# Patient Record
Sex: MALE | Race: WHITE | Employment: OTHER | URBAN - METROPOLITAN AREA
[De-identification: names, ages, dates, MRNs, and addresses within clinical notes are randomized per-mention and may not be internally consistent; named-entity substitution may affect disease eponyms.]

---

## 2018-01-04 PROBLEM — H26.9 RIGHT CATARACT: Status: ACTIVE | Noted: 2018-01-04

## 2018-06-04 ENCOUNTER — ANESTHESIA EVENT (OUTPATIENT)
Dept: OPERATING ROOM | Age: 78
End: 2018-06-04
Payer: MEDICARE

## 2018-06-05 ENCOUNTER — ANESTHESIA (OUTPATIENT)
Dept: OPERATING ROOM | Age: 78
End: 2018-06-05
Payer: MEDICARE

## 2018-06-05 ENCOUNTER — HOSPITAL ENCOUNTER (OUTPATIENT)
Age: 78
Setting detail: OUTPATIENT SURGERY
Discharge: HOME | End: 2018-06-05
Attending: OPHTHALMOLOGY | Admitting: OPHTHALMOLOGY
Payer: MEDICARE

## 2018-06-05 VITALS
OXYGEN SATURATION: 99 % | DIASTOLIC BLOOD PRESSURE: 98 MMHG | RESPIRATION RATE: 15 BRPM | SYSTOLIC BLOOD PRESSURE: 170 MMHG

## 2018-06-05 VITALS
BODY MASS INDEX: 27.74 KG/M2 | SYSTOLIC BLOOD PRESSURE: 166 MMHG | HEIGHT: 68 IN | WEIGHT: 183 LBS | RESPIRATION RATE: 14 BRPM | OXYGEN SATURATION: 97 % | DIASTOLIC BLOOD PRESSURE: 104 MMHG | HEART RATE: 63 BPM

## 2018-06-05 PROBLEM — H57.9 LEFT EYE COMPLAINT: Status: ACTIVE | Noted: 2018-06-05

## 2018-06-05 LAB — GLUCOSE BLD-MCNC: 164 MG/DL

## 2018-06-05 PROCEDURE — 2580000003 HC RX 258: Performed by: ANESTHESIOLOGY

## 2018-06-05 PROCEDURE — 3700000000 HC ANESTHESIA ATTENDED CARE: Performed by: OPHTHALMOLOGY

## 2018-06-05 PROCEDURE — 3600000003 HC SURGERY LEVEL 3 BASE: Performed by: OPHTHALMOLOGY

## 2018-06-05 PROCEDURE — 7100000010 HC PHASE II RECOVERY - FIRST 15 MIN: Performed by: OPHTHALMOLOGY

## 2018-06-05 PROCEDURE — 6370000000 HC RX 637 (ALT 250 FOR IP): Performed by: OPHTHALMOLOGY

## 2018-06-05 PROCEDURE — 82962 GLUCOSE BLOOD TEST: CPT | Performed by: OPHTHALMOLOGY

## 2018-06-05 PROCEDURE — 6360000002 HC RX W HCPCS: Performed by: NURSE ANESTHETIST, CERTIFIED REGISTERED

## 2018-06-05 PROCEDURE — 2500000003 HC RX 250 WO HCPCS: Performed by: OPHTHALMOLOGY

## 2018-06-05 PROCEDURE — V2632 POST CHMBR INTRAOCULAR LENS: HCPCS | Performed by: OPHTHALMOLOGY

## 2018-06-05 PROCEDURE — 7100000011 HC PHASE II RECOVERY - ADDTL 15 MIN: Performed by: OPHTHALMOLOGY

## 2018-06-05 DEVICE — LENS INTOCU +17.0 DIOPT A CONSTANT 118.8 L13MM DIA6MM 0DEG: Type: IMPLANTABLE DEVICE | Site: EYE | Status: FUNCTIONAL

## 2018-06-05 RX ORDER — TETRACAINE HYDROCHLORIDE 5 MG/ML
1 SOLUTION OPHTHALMIC ONCE
Status: COMPLETED | OUTPATIENT
Start: 2018-06-05 | End: 2018-06-05

## 2018-06-05 RX ORDER — TETRACAINE HYDROCHLORIDE 5 MG/ML
SOLUTION OPHTHALMIC PRN
Status: DISCONTINUED | OUTPATIENT
Start: 2018-06-05 | End: 2018-06-05 | Stop reason: HOSPADM

## 2018-06-05 RX ORDER — BALANCED SALT SOLUTION 6.4; .75; .48; .3; 3.9; 1.7 MG/ML; MG/ML; MG/ML; MG/ML; MG/ML; MG/ML
SOLUTION OPHTHALMIC PRN
Status: DISCONTINUED | OUTPATIENT
Start: 2018-06-05 | End: 2018-06-05 | Stop reason: HOSPADM

## 2018-06-05 RX ORDER — PHENYLEPHRINE HYDROCHLORIDE 100 MG/ML
1 SOLUTION/ DROPS OPHTHALMIC PRN
Status: DISCONTINUED | OUTPATIENT
Start: 2018-06-05 | End: 2018-06-05 | Stop reason: HOSPADM

## 2018-06-05 RX ORDER — MIDAZOLAM HYDROCHLORIDE 1 MG/ML
INJECTION INTRAMUSCULAR; INTRAVENOUS PRN
Status: DISCONTINUED | OUTPATIENT
Start: 2018-06-05 | End: 2018-06-05 | Stop reason: SDUPTHER

## 2018-06-05 RX ORDER — PHENYLEPHRINE HCL 2.5 %
1 DROPS OPHTHALMIC (EYE)
Status: COMPLETED | OUTPATIENT
Start: 2018-06-05 | End: 2018-06-05

## 2018-06-05 RX ORDER — CYCLOPENTOLATE HYDROCHLORIDE 10 MG/ML
1 SOLUTION/ DROPS OPHTHALMIC
Status: COMPLETED | OUTPATIENT
Start: 2018-06-05 | End: 2018-06-05

## 2018-06-05 RX ORDER — FENTANYL CITRATE 50 UG/ML
INJECTION, SOLUTION INTRAMUSCULAR; INTRAVENOUS PRN
Status: DISCONTINUED | OUTPATIENT
Start: 2018-06-05 | End: 2018-06-05 | Stop reason: SDUPTHER

## 2018-06-05 RX ORDER — SODIUM CHLORIDE, SODIUM LACTATE, POTASSIUM CHLORIDE, CALCIUM CHLORIDE 600; 310; 30; 20 MG/100ML; MG/100ML; MG/100ML; MG/100ML
INJECTION, SOLUTION INTRAVENOUS CONTINUOUS
Status: DISCONTINUED | OUTPATIENT
Start: 2018-06-05 | End: 2018-06-05 | Stop reason: HOSPADM

## 2018-06-05 RX ORDER — FLURBIPROFEN SODIUM 0.3 MG/ML
1 SOLUTION/ DROPS OPHTHALMIC
Status: COMPLETED | OUTPATIENT
Start: 2018-06-05 | End: 2018-06-05

## 2018-06-05 RX ADMIN — TETRACAINE HYDROCHLORIDE 1 DROP: 5 SOLUTION OPHTHALMIC at 06:20

## 2018-06-05 RX ADMIN — PHENYLEPHRINE HYDROCHLORIDE 1 DROP: 25 SOLUTION OPHTHALMIC at 05:50

## 2018-06-05 RX ADMIN — CYCLOPENTOLATE HYDROCHLORIDE 1 DROP: 10 SOLUTION/ DROPS OPHTHALMIC at 05:45

## 2018-06-05 RX ADMIN — MIDAZOLAM HYDROCHLORIDE 1 MG: 1 INJECTION INTRAMUSCULAR; INTRAVENOUS at 06:34

## 2018-06-05 RX ADMIN — PHENYLEPHRINE HYDROCHLORIDE 1 DROP: 25 SOLUTION OPHTHALMIC at 05:55

## 2018-06-05 RX ADMIN — PHENYLEPHRINE HYDROCHLORIDE 1 DROP: 25 SOLUTION OPHTHALMIC at 05:45

## 2018-06-05 RX ADMIN — FLURBIPROFEN SODIUM 1 DROP: 0.3 SOLUTION/ DROPS OPHTHALMIC at 05:50

## 2018-06-05 RX ADMIN — CYCLOPENTOLATE HYDROCHLORIDE 1 DROP: 10 SOLUTION/ DROPS OPHTHALMIC at 05:50

## 2018-06-05 RX ADMIN — MIDAZOLAM HYDROCHLORIDE 0.5 MG: 1 INJECTION INTRAMUSCULAR; INTRAVENOUS at 06:35

## 2018-06-05 RX ADMIN — FENTANYL CITRATE 50 MCG: 50 INJECTION, SOLUTION INTRAMUSCULAR; INTRAVENOUS at 06:34

## 2018-06-05 RX ADMIN — CYCLOPENTOLATE HYDROCHLORIDE 1 DROP: 10 SOLUTION/ DROPS OPHTHALMIC at 05:55

## 2018-06-05 RX ADMIN — FLURBIPROFEN SODIUM 1 DROP: 0.3 SOLUTION/ DROPS OPHTHALMIC at 05:45

## 2018-06-05 RX ADMIN — MIDAZOLAM HYDROCHLORIDE 0.5 MG: 1 INJECTION INTRAMUSCULAR; INTRAVENOUS at 06:38

## 2018-06-05 RX ADMIN — FENTANYL CITRATE 50 MCG: 50 INJECTION, SOLUTION INTRAMUSCULAR; INTRAVENOUS at 06:36

## 2018-06-05 RX ADMIN — FLURBIPROFEN SODIUM 1 DROP: 0.3 SOLUTION/ DROPS OPHTHALMIC at 05:55

## 2018-06-05 RX ADMIN — SODIUM CHLORIDE, POTASSIUM CHLORIDE, SODIUM LACTATE AND CALCIUM CHLORIDE: 600; 310; 30; 20 INJECTION, SOLUTION INTRAVENOUS at 05:48

## 2018-06-05 ASSESSMENT — PAIN SCALES - GENERAL
PAINLEVEL_OUTOF10: 0

## 2018-06-05 ASSESSMENT — PULMONARY FUNCTION TESTS
PIF_VALUE: 0

## 2018-06-05 ASSESSMENT — PAIN - FUNCTIONAL ASSESSMENT: PAIN_FUNCTIONAL_ASSESSMENT: 0-10

## 2019-06-24 ENCOUNTER — HOSPITAL ENCOUNTER (OUTPATIENT)
Age: 79
Discharge: HOME | End: 2019-06-26

## 2019-06-24 PROCEDURE — 88305 TISSUE EXAM BY PATHOLOGIST: CPT

## 2019-06-24 PROCEDURE — 88342 IMHCHEM/IMCYTCHM 1ST ANTB: CPT

## 2020-08-27 ENCOUNTER — FOLLOWUP TELEPHONE ENCOUNTER (OUTPATIENT)
Dept: DIABETES SERVICES | Age: 80
End: 2020-08-27

## 2020-10-26 ENCOUNTER — HOSPITAL ENCOUNTER (OUTPATIENT)
Age: 80
Discharge: HOME | End: 2020-10-28

## 2020-10-26 PROCEDURE — 88305 TISSUE EXAM BY PATHOLOGIST: CPT

## 2020-10-27 ENCOUNTER — HOSPITAL ENCOUNTER (OUTPATIENT)
Age: 80
Discharge: HOME | End: 2020-10-29

## 2020-10-27 LAB
ANION GAP SERPL CALCULATED.3IONS-SCNC: 9 MMOL/L (ref 7–16)
BUN BLDV-MCNC: 14 MG/DL (ref 8–23)
CALCIUM SERPL-MCNC: 7.7 MG/DL (ref 8.6–10.2)
CHLORIDE BLD-SCNC: 107 MMOL/L (ref 98–107)
CO2: 21 MMOL/L (ref 22–29)
CREAT SERPL-MCNC: 0.8 MG/DL (ref 0.7–1.2)
GFR AFRICAN AMERICAN: >60
GFR NON-AFRICAN AMERICAN: >60 ML/MIN/1.73
GLUCOSE BLD-MCNC: 161 MG/DL (ref 74–99)
HCT VFR BLD CALC: 35.1 % (ref 37–54)
HEMOGLOBIN: 10.5 G/DL (ref 12.5–16.5)
POTASSIUM SERPL-SCNC: 4 MMOL/L (ref 3.5–5)
SODIUM BLD-SCNC: 137 MMOL/L (ref 132–146)

## 2020-10-27 PROCEDURE — 85014 HEMATOCRIT: CPT

## 2020-10-27 PROCEDURE — 85018 HEMOGLOBIN: CPT

## 2020-10-27 PROCEDURE — 80048 BASIC METABOLIC PNL TOTAL CA: CPT

## 2020-11-05 ENCOUNTER — HOSPITAL ENCOUNTER (INPATIENT)
Age: 80
LOS: 4 days | Discharge: HOME | DRG: 698 | End: 2020-11-10
Attending: EMERGENCY MEDICINE | Admitting: UROLOGY
Payer: MEDICARE

## 2020-11-05 PROBLEM — R31.0 HEMATURIA, GROSS: Status: ACTIVE | Noted: 2020-11-05

## 2020-11-05 LAB
ALBUMIN SERPL-MCNC: 2.7 G/DL (ref 3.5–5.2)
ALP BLD-CCNC: 124 U/L (ref 40–129)
ALT SERPL-CCNC: 10 U/L (ref 0–40)
ANION GAP SERPL CALCULATED.3IONS-SCNC: 10 MMOL/L (ref 7–16)
AST SERPL-CCNC: 30 U/L (ref 0–39)
BACTERIA: ABNORMAL /HPF
BASOPHILS ABSOLUTE: 0.08 E9/L (ref 0–0.2)
BASOPHILS RELATIVE PERCENT: 0.4 % (ref 0–2)
BILIRUB SERPL-MCNC: 0.4 MG/DL (ref 0–1.2)
BILIRUBIN URINE: ABNORMAL
BLOOD, URINE: ABNORMAL
BUN BLDV-MCNC: 24 MG/DL (ref 8–23)
CALCIUM SERPL-MCNC: 8 MG/DL (ref 8.6–10.2)
CHLORIDE BLD-SCNC: 96 MMOL/L (ref 98–107)
CLARITY: ABNORMAL
CO2: 24 MMOL/L (ref 22–29)
COLOR: ABNORMAL
CREAT SERPL-MCNC: 1.1 MG/DL (ref 0.7–1.2)
EOSINOPHILS ABSOLUTE: 0.24 E9/L (ref 0.05–0.5)
EOSINOPHILS RELATIVE PERCENT: 1.3 % (ref 0–6)
GFR AFRICAN AMERICAN: >60
GFR NON-AFRICAN AMERICAN: >60 ML/MIN/1.73
GLUCOSE BLD-MCNC: 265 MG/DL (ref 74–99)
GLUCOSE URINE: 100 MG/DL
HCT VFR BLD CALC: 25.4 % (ref 37–54)
HEMOGLOBIN: 8.1 G/DL (ref 12.5–16.5)
IMMATURE GRANULOCYTES #: 0.41 E9/L
IMMATURE GRANULOCYTES %: 2.2 % (ref 0–5)
INR BLD: 1.5
KETONES, URINE: ABNORMAL MG/DL
LACTIC ACID: 1.4 MMOL/L (ref 0.5–2.2)
LEUKOCYTE ESTERASE, URINE: ABNORMAL
LYMPHOCYTES ABSOLUTE: 1.34 E9/L (ref 1.5–4)
LYMPHOCYTES RELATIVE PERCENT: 7 % (ref 20–42)
MCH RBC QN AUTO: 24.9 PG (ref 26–35)
MCHC RBC AUTO-ENTMCNC: 31.9 % (ref 32–34.5)
MCV RBC AUTO: 78.2 FL (ref 80–99.9)
METER GLUCOSE: 160 MG/DL (ref 74–99)
METER GLUCOSE: 233 MG/DL (ref 74–99)
MONOCYTES ABSOLUTE: 1.25 E9/L (ref 0.1–0.95)
MONOCYTES RELATIVE PERCENT: 6.6 % (ref 2–12)
NEUTROPHILS ABSOLUTE: 15.69 E9/L (ref 1.8–7.3)
NEUTROPHILS RELATIVE PERCENT: 82.5 % (ref 43–80)
NITRITE, URINE: POSITIVE
PDW BLD-RTO: 15.9 FL (ref 11.5–15)
PH UA: 6.5 (ref 5–9)
PLATELET # BLD: 378 E9/L (ref 130–450)
PMV BLD AUTO: 11.5 FL (ref 7–12)
POTASSIUM SERPL-SCNC: 4 MMOL/L (ref 3.5–5)
PROTEIN UA: >=300 MG/DL
PROTHROMBIN TIME: 16.6 SEC (ref 9.3–12.4)
RBC # BLD: 3.25 E12/L (ref 3.8–5.8)
RBC UA: ABNORMAL /HPF (ref 0–2)
SODIUM BLD-SCNC: 130 MMOL/L (ref 132–146)
SPECIFIC GRAVITY UA: 1.02 (ref 1–1.03)
TOTAL PROTEIN: 6.4 G/DL (ref 6.4–8.3)
UROBILINOGEN, URINE: 2 E.U./DL
WBC # BLD: 19 E9/L (ref 4.5–11.5)
WBC UA: >20 /HPF (ref 0–5)

## 2020-11-05 PROCEDURE — 99283 EMERGENCY DEPT VISIT LOW MDM: CPT

## 2020-11-05 PROCEDURE — 6370000000 HC RX 637 (ALT 250 FOR IP): Performed by: UROLOGY

## 2020-11-05 PROCEDURE — 96374 THER/PROPH/DIAG INJ IV PUSH: CPT

## 2020-11-05 PROCEDURE — G0378 HOSPITAL OBSERVATION PER HR: HCPCS

## 2020-11-05 PROCEDURE — 87040 BLOOD CULTURE FOR BACTERIA: CPT

## 2020-11-05 PROCEDURE — 6370000000 HC RX 637 (ALT 250 FOR IP): Performed by: PHYSICIAN ASSISTANT

## 2020-11-05 PROCEDURE — 87077 CULTURE AEROBIC IDENTIFY: CPT

## 2020-11-05 PROCEDURE — 2580000003 HC RX 258: Performed by: UROLOGY

## 2020-11-05 PROCEDURE — 82962 GLUCOSE BLOOD TEST: CPT

## 2020-11-05 PROCEDURE — 87088 URINE BACTERIA CULTURE: CPT

## 2020-11-05 PROCEDURE — 6360000002 HC RX W HCPCS: Performed by: EMERGENCY MEDICINE

## 2020-11-05 PROCEDURE — 85025 COMPLETE CBC W/AUTO DIFF WBC: CPT

## 2020-11-05 PROCEDURE — 2580000003 HC RX 258

## 2020-11-05 PROCEDURE — 83605 ASSAY OF LACTIC ACID: CPT

## 2020-11-05 PROCEDURE — 80053 COMPREHEN METABOLIC PANEL: CPT

## 2020-11-05 PROCEDURE — 81001 URINALYSIS AUTO W/SCOPE: CPT

## 2020-11-05 PROCEDURE — 2580000003 HC RX 258: Performed by: EMERGENCY MEDICINE

## 2020-11-05 PROCEDURE — 87186 SC STD MICRODIL/AGAR DIL: CPT

## 2020-11-05 PROCEDURE — 85610 PROTHROMBIN TIME: CPT

## 2020-11-05 PROCEDURE — 51702 INSERT TEMP BLADDER CATH: CPT

## 2020-11-05 RX ORDER — DEXTROSE MONOHYDRATE 25 G/50ML
12.5 INJECTION, SOLUTION INTRAVENOUS PRN
Status: DISCONTINUED | OUTPATIENT
Start: 2020-11-05 | End: 2020-11-11 | Stop reason: HOSPADM

## 2020-11-05 RX ORDER — ATROPA BELLADONNA AND OPIUM 16.2; 6 MG/1; MG/1
60 SUPPOSITORY RECTAL EVERY 8 HOURS PRN
Status: DISCONTINUED | OUTPATIENT
Start: 2020-11-05 | End: 2020-11-05 | Stop reason: SDUPTHER

## 2020-11-05 RX ORDER — FAMOTIDINE 20 MG/1
20 TABLET, FILM COATED ORAL 2 TIMES DAILY
Status: DISCONTINUED | OUTPATIENT
Start: 2020-11-05 | End: 2020-11-11 | Stop reason: HOSPADM

## 2020-11-05 RX ORDER — DEXTROSE MONOHYDRATE 50 MG/ML
100 INJECTION, SOLUTION INTRAVENOUS PRN
Status: DISCONTINUED | OUTPATIENT
Start: 2020-11-05 | End: 2020-11-11 | Stop reason: HOSPADM

## 2020-11-05 RX ORDER — ROPINIROLE 0.25 MG/1
0.25 TABLET, FILM COATED ORAL NIGHTLY
COMMUNITY

## 2020-11-05 RX ORDER — ATORVASTATIN CALCIUM 20 MG/1
20 TABLET, FILM COATED ORAL NIGHTLY
Status: DISCONTINUED | OUTPATIENT
Start: 2020-11-05 | End: 2020-11-11 | Stop reason: HOSPADM

## 2020-11-05 RX ORDER — ATORVASTATIN CALCIUM 20 MG/1
20 TABLET, FILM COATED ORAL DAILY
COMMUNITY

## 2020-11-05 RX ORDER — FAMOTIDINE 20 MG/1
20 TABLET, FILM COATED ORAL 2 TIMES DAILY
COMMUNITY

## 2020-11-05 RX ORDER — SODIUM CHLORIDE 0.9 % (FLUSH) 0.9 %
SYRINGE (ML) INJECTION
Status: COMPLETED
Start: 2020-11-05 | End: 2020-11-05

## 2020-11-05 RX ORDER — FLECAINIDE ACETATE 100 MG/1
50 TABLET ORAL 2 TIMES DAILY
Status: DISCONTINUED | OUTPATIENT
Start: 2020-11-05 | End: 2020-11-11 | Stop reason: HOSPADM

## 2020-11-05 RX ORDER — SODIUM CHLORIDE, SODIUM LACTATE, POTASSIUM CHLORIDE, CALCIUM CHLORIDE 600; 310; 30; 20 MG/100ML; MG/100ML; MG/100ML; MG/100ML
INJECTION, SOLUTION INTRAVENOUS CONTINUOUS
Status: DISCONTINUED | OUTPATIENT
Start: 2020-11-05 | End: 2020-11-09

## 2020-11-05 RX ORDER — ATROPA BELLADONNA AND OPIUM 16.2; 6 MG/1; MG/1
60 SUPPOSITORY RECTAL EVERY 8 HOURS PRN
Status: DISCONTINUED | OUTPATIENT
Start: 2020-11-05 | End: 2020-11-10

## 2020-11-05 RX ORDER — LANOLIN ALCOHOL/MO/W.PET/CERES
325 CREAM (GRAM) TOPICAL
COMMUNITY

## 2020-11-05 RX ORDER — OXYCODONE HYDROCHLORIDE AND ACETAMINOPHEN 5; 325 MG/1; MG/1
1 TABLET ORAL EVERY 4 HOURS PRN
Status: DISCONTINUED | OUTPATIENT
Start: 2020-11-05 | End: 2020-11-11 | Stop reason: HOSPADM

## 2020-11-05 RX ORDER — NICOTINE POLACRILEX 4 MG
15 LOZENGE BUCCAL PRN
Status: DISCONTINUED | OUTPATIENT
Start: 2020-11-05 | End: 2020-11-11 | Stop reason: HOSPADM

## 2020-11-05 RX ORDER — ROPINIROLE 0.25 MG/1
0.25 TABLET, FILM COATED ORAL NIGHTLY
Status: DISCONTINUED | OUTPATIENT
Start: 2020-11-05 | End: 2020-11-11 | Stop reason: HOSPADM

## 2020-11-05 RX ORDER — FLECAINIDE ACETATE 100 MG/1
100 TABLET ORAL 2 TIMES DAILY
COMMUNITY

## 2020-11-05 RX ADMIN — SACUBITRIL AND VALSARTAN 1 TABLET: 49; 51 TABLET, FILM COATED ORAL at 22:06

## 2020-11-05 RX ADMIN — SODIUM CHLORIDE, POTASSIUM CHLORIDE, SODIUM LACTATE AND CALCIUM CHLORIDE: 600; 310; 30; 20 INJECTION, SOLUTION INTRAVENOUS at 11:00

## 2020-11-05 RX ADMIN — ROPINIROLE HYDROCHLORIDE 0.25 MG: 0.25 TABLET, FILM COATED ORAL at 22:06

## 2020-11-05 RX ADMIN — ATORVASTATIN CALCIUM 20 MG: 20 TABLET, FILM COATED ORAL at 22:05

## 2020-11-05 RX ADMIN — ATROPA BELLADONNA AND OPIUM 60 MG: 16.2; 6 SUPPOSITORY RECTAL at 19:16

## 2020-11-05 RX ADMIN — FAMOTIDINE 20 MG: 20 TABLET ORAL at 22:05

## 2020-11-05 RX ADMIN — SODIUM CHLORIDE, PRESERVATIVE FREE 10 ML: 5 INJECTION INTRAVENOUS at 11:00

## 2020-11-05 RX ADMIN — CEFTRIAXONE SODIUM 1 G: 1 INJECTION, POWDER, FOR SOLUTION INTRAMUSCULAR; INTRAVENOUS at 04:38

## 2020-11-05 RX ADMIN — FLECAINIDE ACETATE 50 MG: 100 TABLET ORAL at 22:06

## 2020-11-05 RX ADMIN — OXYCODONE AND ACETAMINOPHEN 1 TABLET: 5; 325 TABLET ORAL at 22:07

## 2020-11-05 ASSESSMENT — PAIN DESCRIPTION - LOCATION
LOCATION: OTHER (COMMENT)
LOCATION: ABDOMEN

## 2020-11-05 ASSESSMENT — PAIN SCALES - GENERAL
PAINLEVEL_OUTOF10: 0
PAINLEVEL_OUTOF10: 9

## 2020-11-05 ASSESSMENT — PAIN DESCRIPTION - DESCRIPTORS
DESCRIPTORS: THROBBING
DESCRIPTORS: THROBBING

## 2020-11-05 NOTE — ED NOTES
Two sets of blood cultures have been drawn by Sophronia Dubin, LPN and sent to lab, patient medicated per order.       Claudette Ellis, RN  11/05/20 Surekha Ferro 1772, RN  11/05/20 1467

## 2020-11-05 NOTE — H&P
[address]                  [address] [place],  [facility]                              HISTORY AND PHYSICAL    PATIENT NAME: Lia Sue                    :        1940  MED REC NO:   [id number]                            ROOM:       8210  ACCOUNT NO:   [de-identified]                           ADMIT DATE: 2020  PROVIDER:     Garrett Werner MD    He is currently in the emergency room with a chief complaint of  hematuria. PRESENT ILLNESS:  This 15-year-old male who underwent a TUR of his  prostate on 10/26/2020. Initially had his catheter removed and voided  with some hematuria; however, this became progressively severe and he  was seen in the office, a Champion catheter was inserted; multiple clots  were evacuated. The patient's catheter occluded; he came to the  emergency room on the morning of 2020, Champion was removed, was  replaced with a three-way large bore Champion and again multiple clots were  evacuated. The patient had been taking Eliquis which was advised to be put on hold;  however, through an error in medication the patient apparently has been  receiving Eliquis from the family on a regular basis. The last time it  was taken was on 2020. The patient's hemoglobin is 8 at this  time. PAST MEDICAL HISTORY:    ALLERGIES:  None known to medication. PAST SURGICAL HISTORY:  Includes TUR of the prostate on 10/26/2020. He  has also had cataract surgery in 2018 and had a skin biopsy in the past.    MEDICATIONS AT HOME:  Include Toujeo, aspirin, Eliquis which was  inadvertently given, vitamin B12, hydrochlorothiazide, omeprazole,  Celexa, Tenormin, Prinivil, Lipitor, Glucophage. REVIEW OF SYSTEMS:  RESPIRATORY:  He has not had coughing or hemoptysis. CARDIOVASCULAR:  Denies any recent chest pain. GASTROINTESTINAL:  His weight has been stable. FAMILY HISTORY:  Unremarkable.     SOCIAL HISTORY:  The patient lives at home. His son and daughter are  his caregivers. PHYSICAL EXAMINATION:  GENERAL:  The patient is alert, oriented, somewhat pale in appearance;  does not appear at that this time to be in any distress. LUNGS:  Clear. HEART:  Slow rate. ABDOMEN:  Soft. There is some tenderness in the suprapubic area. GENITALIA:  Penis, Champion catheter is in place. This irrigates quite  readily. Testes are normal.    IMPRESSION:  Post TUR bleeding in the presence of continued Eliquis. PLAN:  Admit the patient for continuous irrigation, observe for any  further bleeding. Eliquis will be put on hold. The patient if  necessary will undergo cystoscopy and fulguration of his prostatic  fossa. We will consult Medicine for management of his diabetes as well  as his history of atrial fibrillation.         Rowena Parrish MD    D: 11/05/2020 7:49:15       T: 11/05/2020 7:57:12     [id number]  Job#: [id number]     Doc#: [id number]    CC:

## 2020-11-05 NOTE — ED NOTES
Blood cultures obtained from left forearm per policy. Set one of two drawn at this time by Iza mitchell lpn.             Johanna Mayen LPN  49/90/20 6790

## 2020-11-05 NOTE — ED NOTES
Bed: 22  Expected date:   Expected time:   Means of arrival:   Comments:  Triage       Ilana Ayoub RN  11/05/20 1558

## 2020-11-05 NOTE — ED NOTES
Three way mcduffie catheter was previously inserted by other staff. Manually irrigated per order at this time.       Amparo Alejo RN  11/05/20 3010

## 2020-11-05 NOTE — ED NOTES
Champion catheter continues to drain bloody urine, no clots noted at present- Dr. Deidra Demarco notified.       Brooks Govea RN  11/05/20 9387

## 2020-11-05 NOTE — ED NOTES
Blood cultures obtained from left hand per policy. Set  two of two drawn at this time by Hortencia mitchell lpn.             Tank Sanches LPN  55/51/53 1400

## 2020-11-05 NOTE — ED NOTES
Called and notified lab of add-on urine culture order, spoke to Lamonte Mac. Wayna Mcardle, RN  11/05/20 3345

## 2020-11-05 NOTE — ED PROVIDER NOTES
HPI:  11/5/20, Time: 2:57 AM SHANEKA Nova is a [de-identified] y.o. male presenting to the ED for lower abdominal pain status post prostatectomy, beginning over 1 week ago. The complaint has been persistent, moderate in severity, and worsened by nothing. Patient reported he had a prostatectomy done on October 26. Patient states since then he has been having lower abdominal pain. Patient has had catheters taken out and then replaced. Patient reporting that he has been passing blood in his catheter since the surgery. Patient reporting no fever chills he reports no nausea or vomiting. Patient reporting no chest pain. Patient reports he was on Coumadin up until several weeks ago. Patient reporting no headache or flank pain. There is no leg pain. ROS:   Pertinent positives and negatives are stated within HPI, all other systems reviewed and are negative.  --------------------------------------------- PAST HISTORY ---------------------------------------------  Past Medical History:  has a past medical history of CAD (coronary artery disease), [facility] [address] [facility] GERD (gastroesophageal reflux disease), Hyperlipidemia, Hypertension, and Myocardial infarction ([facility] Past Surgical History:  has a past surgical history that includes skin biopsy; Cataract removal with implant (Right, 01/04/2018); Cataract removal (Left, 06/05/2018); and pr xcapsl ctrc rmvl insj io lens prosth w/o ecp (Left, 6/5/2018). Social History:  reports that he has quit smoking. His smoking use included cigarettes. He smoked 25.00 packs per day. He has never used smokeless tobacco. He reports that he does not drink alcohol. Family History: family history is not on file. The patients home medications have been reviewed. Allergies: Patient has no known allergies.     ---------------------------------------------------PHYSICAL EXAM--------------------------------------    Constitutional/General: Alert and oriented x3,   Head: Normocephalic and atraumatic  Eyes: PERRL, EOMI  Mouth: Oropharynx clear, handling secretions, no trismus  Neck: Supple, full ROM, non tender to palpation in the midline, no stridor, no crepitus, no meningeal signs  Pulmonary: Lungs clear to auscultation bilaterally, no wheezes, rales, or rhonchi. Not in respiratory distress  Cardiovascular:  Regular rate. Regular rhythm. No murmurs, gallops, or rubs. 2+ distal pulses  Chest: no chest wall tenderness  Abdomen: Soft. Tender lower abdomen Non distended. +BS. No rebound, guarding, or rigidity. No pulsatile masses appreciated. Noted Champion catheter in place with blood tinged urine  There is noted clotted blood around meatus  Musculoskeletal: Moves all extremities x 4. Warm and well perfused, no clubbing, cyanosis, or edema. Capillary refill <3 seconds  Skin: warm and dry. No rashes. Neurologic: GCS 15, CN 2-12 grossly intact, no focal deficits, symmetric strength 5/5 in the upper and lower extremities bilaterally  Psych: Normal Affect    -------------------------------------------------- RESULTS -------------------------------------------------  I have personally reviewed all laboratory and imaging results for this patient. Results are listed below.      LABS:  Results for orders placed or performed during the hospital encounter of 11/05/20   CBC auto differential   Result Value Ref Range    WBC 19.0 (H) 4.5 - 11.5 E9/L    RBC 3.25 (L) 3.80 - 5.80 E12/L    Hemoglobin 8.1 (L) 12.5 - 16.5 g/dL    Hematocrit 25.4 (L) 37.0 - 54.0 %    MCV 78.2 (L) 80.0 - 99.9 fL    MCH 24.9 (L) 26.0 - 35.0 pg    MCHC 31.9 (L) 32.0 - 34.5 %    RDW 15.9 (H) 11.5 - 15.0 fL    Platelets 057 214 - 082 E9/L    MPV 11.5 7.0 - 12.0 fL    Neutrophils % 82.5 (H) 43.0 - 80.0 %    Immature Granulocytes % 2.2 0.0 - 5.0 %    Lymphocytes % 7.0 (L) 20.0 - 42.0 %    Monocytes % 6.6 2.0 - 12.0 %    Eosinophils % 1.3 0.0 - 6.0 %    Basophils % 0.4 0.0 - 2.0 %    Neutrophils Absolute 15.69 (H) 1.80 - 7.30 E9/L    Immature Granulocytes # 0.41 E9/L    Lymphocytes Absolute 1.34 (L) 1.50 - 4.00 E9/L    Monocytes Absolute 1.25 (H) 0.10 - 0.95 E9/L    Eosinophils Absolute 0.24 0.05 - 0.50 E9/L    Basophils Absolute 0.08 0.00 - 0.20 E9/L   Comprehensive Metabolic Panel   Result Value Ref Range    Sodium 130 (L) 132 - 146 mmol/L    Potassium 4.0 3.5 - 5.0 mmol/L    Chloride 96 (L) 98 - 107 mmol/L    CO2 24 22 - 29 mmol/L    Anion Gap 10 7 - 16 mmol/L    Glucose 265 (H) 74 - 99 mg/dL    BUN 24 (H) 8 - 23 mg/dL    CREATININE 1.1 0.7 - 1.2 mg/dL    GFR Non-African American >60 >=60 mL/min/1.73    GFR African American >60     Calcium 8.0 (L) 8.6 - 10.2 mg/dL    Total Protein 6.4 6.4 - 8.3 g/dL    Alb 2.7 (L) 3.5 - 5.2 g/dL    Total Bilirubin 0.4 0.0 - 1.2 mg/dL    Alkaline Phosphatase 124 40 - 129 U/L    ALT 10 0 - 40 U/L    AST 30 0 - 39 U/L   Urinalysis   Result Value Ref Range    Color, UA FARTUN (A) Straw/Yellow    Clarity, UA CLOUDY (A) Clear    Glucose, Ur 100 (A) Negative mg/dL    Bilirubin Urine MODERATE (A) Negative    Ketones, Urine TRACE (A) Negative mg/dL    Specific Gravity, UA 1.020 1.005 - 1.030    Blood, Urine LARGE (A) Negative    pH, UA 6.5 5.0 - 9.0    Protein, UA >=300 (A) Negative mg/dL    Urobilinogen, Urine 2.0 (A) <2.0 E.U./dL    Nitrite, Urine POSITIVE (A) Negative    Leukocyte Esterase, Urine LARGE (A) Negative   Lactic Acid, Plasma   Result Value Ref Range    Lactic Acid 1.4 0.5 - 2.2 mmol/L   Protime-INR   Result Value Ref Range    Protime 16.6 (H) 9.3 - 12.4 sec    INR 1.5    Microscopic Urinalysis   Result Value Ref Range    WBC, UA >20 (A) 0 - 5 /HPF    RBC, UA PACKED 0 - 2 /HPF    Bacteria, UA MODERATE (A) None Seen /HPF       RADIOLOGY:  Interpreted by Radiologist.  No orders to display         EKG Interpretation      ------------------------- NURSING NOTES AND VITALS REVIEWED ---------------------------   The nursing notes within the ED encounter and vital signs as below have been reviewed by myself. /61   Pulse 73   Temp 98.2 °F (36.8 °C) (Oral)   Resp 16   Ht 5' 8\" (1.727 m)   Wt 185 lb (83.9 kg)   SpO2 100%   BMI 28.13 kg/m²   Oxygen Saturation Interpretation: Normal    The patients available past medical records and past encounters were reviewed. ------------------------------ ED COURSE/MEDICAL DECISION MAKING----------------------  Medications   cefTRIAXone (ROCEPHIN) 1 g in sterile water 10 mL IV syringe (0 g Intravenous Stopped 11/5/20 0444)         PROCEDURE  11/5/20       Time: 312 am    CHATTERJEE CATHETER INSERTION  Risks, benefits and alternatives (for applicable procedures below) described. Performed By: Briscoe Olszewski, MD.    Indication: hematuria. Informed consent obtained: The patient provided verbal consent for this procedure. .  Procedure:  A 22F sized Chatterjee catheter was passed with success and  900 to 1000 ml of urine was removed. The catheter was left in place. Patient tolerated the procedure well. Complications:  None. Urology Consult Placed:  No.         Medical Decision Making:        Re-Evaluations:             Re-evaluation. Symptoms are improving. Patient had Chatterjee catheter placed there is immediate return of bloody urine. Patient reporting significant improvement after placement of three-way Chatterjee    Patient Chatterjee manually irrigated improving but still has clots and urine is clear but still bloody. Patient having no abdominal pain on exam.  Patient fully irrigated again at around 6:40 AM.  Consultations:             I did speak to Colby Goff and they will evaluate this morning. Critical Care: This patient's ED course included: a personal history and physicial eaxmination    This patient has been closely monitored during their ED course. Counseling:    The emergency provider has spoken with the patient and discussed todays results, in addition to providing specific details for the plan of care and counseling regarding the diagnosis and prognosis. Questions are answered at this time and they are agreeable with the plan.       --------------------------------- IMPRESSION AND DISPOSITION ---------------------------------    IMPRESSION  1. Hematuria, unspecified type    2. Urinary retention        DISPOSITION  Disposition: Urology to evaluate  Patient condition is stable        NOTE: This report was transcribed using voice recognition software.  Every effort was made to ensure accuracy; however, inadvertent computerized transcription errors may be present          Jonathan James MD  11/05/20 9402       Jonathan James MD  11/05/20 4483

## 2020-11-06 ENCOUNTER — ANESTHESIA EVENT (OUTPATIENT)
Dept: OPERATING ROOM | Age: 80
DRG: 698 | End: 2020-11-06
Payer: MEDICARE

## 2020-11-06 ENCOUNTER — ANESTHESIA (OUTPATIENT)
Dept: OPERATING ROOM | Age: 80
DRG: 698 | End: 2020-11-06
Payer: MEDICARE

## 2020-11-06 VITALS — OXYGEN SATURATION: 94 % | SYSTOLIC BLOOD PRESSURE: 70 MMHG | DIASTOLIC BLOOD PRESSURE: 60 MMHG

## 2020-11-06 PROBLEM — R31.9 HEMATURIA: Status: ACTIVE | Noted: 2020-11-06

## 2020-11-06 PROBLEM — I48.20 CHRONIC ATRIAL FIBRILLATION (HCC): Status: ACTIVE | Noted: 2020-11-06

## 2020-11-06 PROBLEM — D62 ACUTE BLOOD LOSS ANEMIA: Status: ACTIVE | Noted: 2020-11-06

## 2020-11-06 LAB
ANION GAP SERPL CALCULATED.3IONS-SCNC: 7 MMOL/L (ref 7–16)
BUN BLDV-MCNC: 16 MG/DL (ref 8–23)
CALCIUM SERPL-MCNC: 8 MG/DL (ref 8.6–10.2)
CHLORIDE BLD-SCNC: 104 MMOL/L (ref 98–107)
CO2: 25 MMOL/L (ref 22–29)
CREAT SERPL-MCNC: 0.8 MG/DL (ref 0.7–1.2)
GFR AFRICAN AMERICAN: >60
GFR NON-AFRICAN AMERICAN: >60 ML/MIN/1.73
GLUCOSE BLD-MCNC: 135 MG/DL (ref 74–99)
HCT VFR BLD CALC: 24.6 % (ref 37–54)
HEMOGLOBIN: 7.4 G/DL (ref 12.5–16.5)
MCH RBC QN AUTO: 24.1 PG (ref 26–35)
MCHC RBC AUTO-ENTMCNC: 30.1 % (ref 32–34.5)
MCV RBC AUTO: 80.1 FL (ref 80–99.9)
METER GLUCOSE: 142 MG/DL (ref 74–99)
METER GLUCOSE: 144 MG/DL (ref 74–99)
METER GLUCOSE: 151 MG/DL (ref 74–99)
METER GLUCOSE: 153 MG/DL (ref 74–99)
PDW BLD-RTO: 16 FL (ref 11.5–15)
PLATELET # BLD: 404 E9/L (ref 130–450)
PMV BLD AUTO: 12 FL (ref 7–12)
POTASSIUM SERPL-SCNC: 3.6 MMOL/L (ref 3.5–5)
RBC # BLD: 3.07 E12/L (ref 3.8–5.8)
SODIUM BLD-SCNC: 136 MMOL/L (ref 132–146)
WBC # BLD: 15 E9/L (ref 4.5–11.5)

## 2020-11-06 PROCEDURE — 6360000002 HC RX W HCPCS: Performed by: UROLOGY

## 2020-11-06 PROCEDURE — 80048 BASIC METABOLIC PNL TOTAL CA: CPT

## 2020-11-06 PROCEDURE — 96375 TX/PRO/DX INJ NEW DRUG ADDON: CPT

## 2020-11-06 PROCEDURE — 82962 GLUCOSE BLOOD TEST: CPT

## 2020-11-06 PROCEDURE — 96376 TX/PRO/DX INJ SAME DRUG ADON: CPT

## 2020-11-06 PROCEDURE — 2580000003 HC RX 258: Performed by: NURSE ANESTHETIST, CERTIFIED REGISTERED

## 2020-11-06 PROCEDURE — 2709999900 HC NON-CHARGEABLE SUPPLY: Performed by: UROLOGY

## 2020-11-06 PROCEDURE — 36415 COLL VENOUS BLD VENIPUNCTURE: CPT

## 2020-11-06 PROCEDURE — 3600000013 HC SURGERY LEVEL 3 ADDTL 15MIN: Performed by: UROLOGY

## 2020-11-06 PROCEDURE — 1200000000 HC SEMI PRIVATE

## 2020-11-06 PROCEDURE — 6370000000 HC RX 637 (ALT 250 FOR IP): Performed by: PHYSICIAN ASSISTANT

## 2020-11-06 PROCEDURE — 2580000003 HC RX 258: Performed by: UROLOGY

## 2020-11-06 PROCEDURE — 7100000001 HC PACU RECOVERY - ADDTL 15 MIN: Performed by: UROLOGY

## 2020-11-06 PROCEDURE — 51700 IRRIGATION OF BLADDER: CPT

## 2020-11-06 PROCEDURE — 6360000002 HC RX W HCPCS: Performed by: ANESTHESIOLOGY

## 2020-11-06 PROCEDURE — 6360000002 HC RX W HCPCS

## 2020-11-06 PROCEDURE — 3700000001 HC ADD 15 MINUTES (ANESTHESIA): Performed by: UROLOGY

## 2020-11-06 PROCEDURE — 6370000000 HC RX 637 (ALT 250 FOR IP): Performed by: UROLOGY

## 2020-11-06 PROCEDURE — 85027 COMPLETE CBC AUTOMATED: CPT

## 2020-11-06 PROCEDURE — 3600000003 HC SURGERY LEVEL 3 BASE: Performed by: UROLOGY

## 2020-11-06 PROCEDURE — 7100000000 HC PACU RECOVERY - FIRST 15 MIN: Performed by: UROLOGY

## 2020-11-06 PROCEDURE — 3700000000 HC ANESTHESIA ATTENDED CARE: Performed by: UROLOGY

## 2020-11-06 PROCEDURE — 0TCB8ZZ EXTIRPATION OF MATTER FROM BLADDER, VIA NATURAL OR ARTIFICIAL OPENING ENDOSCOPIC: ICD-10-PCS | Performed by: UROLOGY

## 2020-11-06 RX ORDER — FENTANYL CITRATE 50 UG/ML
100 INJECTION, SOLUTION INTRAMUSCULAR; INTRAVENOUS ONCE
Status: COMPLETED | OUTPATIENT
Start: 2020-11-06 | End: 2020-11-06

## 2020-11-06 RX ORDER — MEPERIDINE HYDROCHLORIDE 25 MG/ML
12.5 INJECTION INTRAMUSCULAR; INTRAVENOUS; SUBCUTANEOUS EVERY 5 MIN PRN
Status: DISCONTINUED | OUTPATIENT
Start: 2020-11-06 | End: 2020-11-06 | Stop reason: HOSPADM

## 2020-11-06 RX ORDER — KETOROLAC TROMETHAMINE 30 MG/ML
30 INJECTION, SOLUTION INTRAMUSCULAR; INTRAVENOUS ONCE
Status: COMPLETED | OUTPATIENT
Start: 2020-11-06 | End: 2020-11-06

## 2020-11-06 RX ORDER — HYDROCODONE BITARTRATE AND ACETAMINOPHEN 5; 325 MG/1; MG/1
2 TABLET ORAL PRN
Status: DISCONTINUED | OUTPATIENT
Start: 2020-11-06 | End: 2020-11-06 | Stop reason: HOSPADM

## 2020-11-06 RX ORDER — PROMETHAZINE HYDROCHLORIDE 25 MG/ML
6.25 INJECTION, SOLUTION INTRAMUSCULAR; INTRAVENOUS EVERY 10 MIN PRN
Status: DISCONTINUED | OUTPATIENT
Start: 2020-11-06 | End: 2020-11-06 | Stop reason: HOSPADM

## 2020-11-06 RX ORDER — SODIUM CHLORIDE 9 MG/ML
INJECTION, SOLUTION INTRAVENOUS CONTINUOUS PRN
Status: DISCONTINUED | OUTPATIENT
Start: 2020-11-06 | End: 2020-11-06 | Stop reason: SDUPTHER

## 2020-11-06 RX ORDER — MORPHINE SULFATE 2 MG/ML
2 INJECTION, SOLUTION INTRAMUSCULAR; INTRAVENOUS EVERY 5 MIN PRN
Status: DISCONTINUED | OUTPATIENT
Start: 2020-11-06 | End: 2020-11-06 | Stop reason: HOSPADM

## 2020-11-06 RX ORDER — FENTANYL CITRATE 50 UG/ML
INJECTION, SOLUTION INTRAMUSCULAR; INTRAVENOUS PRN
Status: DISCONTINUED | OUTPATIENT
Start: 2020-11-06 | End: 2020-11-06 | Stop reason: SDUPTHER

## 2020-11-06 RX ORDER — MORPHINE SULFATE 2 MG/ML
1 INJECTION, SOLUTION INTRAMUSCULAR; INTRAVENOUS EVERY 5 MIN PRN
Status: DISCONTINUED | OUTPATIENT
Start: 2020-11-06 | End: 2020-11-06 | Stop reason: HOSPADM

## 2020-11-06 RX ORDER — PROPOFOL 10 MG/ML
INJECTION, EMULSION INTRAVENOUS CONTINUOUS PRN
Status: DISCONTINUED | OUTPATIENT
Start: 2020-11-06 | End: 2020-11-06 | Stop reason: SDUPTHER

## 2020-11-06 RX ORDER — ATROPA BELLADONNA AND OPIUM 16.2; 6 MG/1; MG/1
SUPPOSITORY RECTAL PRN
Status: DISCONTINUED | OUTPATIENT
Start: 2020-11-06 | End: 2020-11-06 | Stop reason: ALTCHOICE

## 2020-11-06 RX ORDER — HYDROCODONE BITARTRATE AND ACETAMINOPHEN 5; 325 MG/1; MG/1
1 TABLET ORAL PRN
Status: DISCONTINUED | OUTPATIENT
Start: 2020-11-06 | End: 2020-11-06 | Stop reason: HOSPADM

## 2020-11-06 RX ADMIN — KETOROLAC TROMETHAMINE 30 MG: 30 INJECTION, SOLUTION INTRAMUSCULAR at 13:56

## 2020-11-06 RX ADMIN — SACUBITRIL AND VALSARTAN 1 TABLET: 49; 51 TABLET, FILM COATED ORAL at 22:00

## 2020-11-06 RX ADMIN — SODIUM CHLORIDE: 9 INJECTION, SOLUTION INTRAVENOUS at 14:06

## 2020-11-06 RX ADMIN — PROPOFOL 50 MCG/KG/MIN: 10 INJECTION, EMULSION INTRAVENOUS at 14:24

## 2020-11-06 RX ADMIN — SODIUM CHLORIDE, POTASSIUM CHLORIDE, SODIUM LACTATE AND CALCIUM CHLORIDE: 600; 310; 30; 20 INJECTION, SOLUTION INTRAVENOUS at 12:30

## 2020-11-06 RX ADMIN — FENTANYL CITRATE 50 MCG: 50 INJECTION, SOLUTION INTRAMUSCULAR; INTRAVENOUS at 14:38

## 2020-11-06 RX ADMIN — ROPINIROLE HYDROCHLORIDE 0.25 MG: 0.25 TABLET, FILM COATED ORAL at 22:00

## 2020-11-06 RX ADMIN — FENTANYL CITRATE 50 MCG: 50 INJECTION, SOLUTION INTRAMUSCULAR; INTRAVENOUS at 14:24

## 2020-11-06 RX ADMIN — FAMOTIDINE 20 MG: 20 TABLET ORAL at 22:00

## 2020-11-06 RX ADMIN — WATER 1 G: 1 INJECTION INTRAMUSCULAR; INTRAVENOUS; SUBCUTANEOUS at 06:35

## 2020-11-06 RX ADMIN — INSULIN LISPRO 1 UNITS: 100 INJECTION, SOLUTION INTRAVENOUS; SUBCUTANEOUS at 22:03

## 2020-11-06 RX ADMIN — FLECAINIDE ACETATE 50 MG: 100 TABLET ORAL at 22:00

## 2020-11-06 RX ADMIN — FENTANYL CITRATE 100 MCG: 50 INJECTION, SOLUTION INTRAMUSCULAR; INTRAVENOUS at 13:35

## 2020-11-06 RX ADMIN — SODIUM CHLORIDE, POTASSIUM CHLORIDE, SODIUM LACTATE AND CALCIUM CHLORIDE: 600; 310; 30; 20 INJECTION, SOLUTION INTRAVENOUS at 19:24

## 2020-11-06 RX ADMIN — PHENYLEPHRINE HYDROCHLORIDE 100 MCG: 10 INJECTION INTRAVENOUS at 14:50

## 2020-11-06 RX ADMIN — ATORVASTATIN CALCIUM 20 MG: 20 TABLET, FILM COATED ORAL at 22:00

## 2020-11-06 ASSESSMENT — PULMONARY FUNCTION TESTS
PIF_VALUE: 0

## 2020-11-06 ASSESSMENT — PAIN SCALES - GENERAL
PAINLEVEL_OUTOF10: 0
PAINLEVEL_OUTOF10: 10
PAINLEVEL_OUTOF10: 0
PAINLEVEL_OUTOF10: 10

## 2020-11-06 ASSESSMENT — PAIN DESCRIPTION - PAIN TYPE
TYPE: ACUTE PAIN
TYPE: SURGICAL PAIN
TYPE: ACUTE PAIN

## 2020-11-06 ASSESSMENT — PAIN DESCRIPTION - ORIENTATION
ORIENTATION: LOWER
ORIENTATION: LOWER

## 2020-11-06 ASSESSMENT — PAIN DESCRIPTION - DESCRIPTORS
DESCRIPTORS: CRAMPING;CRUSHING
DESCRIPTORS: CONSTANT;CRAMPING

## 2020-11-06 ASSESSMENT — PAIN DESCRIPTION - LOCATION
LOCATION: ABDOMEN
LOCATION: PENIS
LOCATION: ABDOMEN

## 2020-11-06 NOTE — CONSULTS
[address] [facility]  Initial Consult Note      REASON FOR CONSULTATION: Medical management of diabetes and atrial fibrillation    ATTENDING/ADMITTING PHYSICIAN: Dr. Honeycutt :      The patient is a [de-identified] y.o. male patient of Dr. Kimberlee Milton history of CAD, A. fib, diabetes, hypertension who presents with Gross hematuria. Patient had a recent TURP 10/26. Since catheter removal he has had worsening hematuria. Patient had catheter replaced which then became clogged. Patient returned to ED and had three-way placed with extensive clots. Patient is on Eliquis for A. fib. He has been taking his Eliquis. Patient does note continued lower abdomen/suprapubic pain. Nonradiating no exacerbation relief. No chest pain, trouble breathing, vomiting, or diarrhea. No fevers or chills. Patient states  chronic medical problems are well controlled.          Past Medical History:   Diagnosis Date    CAD (coronary artery disease)     Cancer ([facility] [address]     skin     Diabetes mellitus ([facility] [address]     GERD (gastroesophageal reflux disease)     Hyperlipidemia     Hypertension     Myocardial infarction ([facility] [address] 1999           Past Surgical History:   Procedure Laterality Date    CATARACT REMOVAL Left 06/05/2018    Cataract Extraction with IOL Left Eye    CATARACT REMOVAL WITH IMPLANT Right 01/04/2018    IL XCAPSL CTRC RMVL INSJ IO LENS PROSTH W/O ECP Left 6/5/2018    CATARACT EXTRACTION WITH IOL LEFT EYE performed by Zac Paredes MD at [address] [place] [address]         Medications Prior to Admission:    Medications Prior to Admission: flecainide (TAMBOCOR) 50 MG tablet, Take 50 mg by mouth 2 times daily Do not know mg  ferrous sulfate (FE TABS 325) 325 (65 Fe) MG EC tablet, Take 325 mg by mouth 2 times daily (with meals)  sacubitril-valsartan (ENTRESTO) 49-51 MG per tablet, Take 1 tablet by mouth 2 times daily  famotidine (PEPCID) 20 MG tablet, Take 20 mg by mouth 2 times daily  apixaban (ELIQUIS) 5 MG TABS tablet, Take by mouth 2 times daily  atorvastatin (LIPITOR) 20 MG tablet, Take 20 mg by mouth daily  rOPINIRole (REQUIP) 0.25 MG tablet, Take 0.25 mg by mouth nightly  Insulin Glargine (TOUJEO MAX SOLOSTAR SC), Inject 60 Units into the skin nightly   aspirin 81 MG tablet, Take 81 mg by mouth daily  vitamin B-12 (CYANOCOBALAMIN) 1000 MCG tablet, Take 1,000 mcg by mouth daily  lisinopril (PRINIVIL;ZESTRIL) 10 MG tablet, Take 5 mg by mouth daily   [DISCONTINUED] atorvastatin (LIPITOR) 20 MG tablet, Take 20 mg by mouth daily    Note that the patient's home medications were reviewed and the above list is accurate to the best of my knowledge at the time of the exam.      Allergies:    Patient has no known allergies. Social History:    reports that he has quit smoking. His smoking use included cigarettes. He smoked 25.00 packs per day. He has never used smokeless tobacco. He reports that he does not drink alcohol. Family History:   pt denies contributory history     REVIEW OF SYSTEMS:  As above in the HPI, otherwise negative    PHYSICAL EXAM:    Vitals:  /61   Pulse 76   Temp 97.8 °F (36.6 °C) (Temporal)   Resp 16   Ht 5' 8\" (1.727 m)   Wt 185 lb 10 oz (84.2 kg)   SpO2 97%   BMI 28.22 kg/m²     General:  Awake, alert, oriented X 3. Well developed, well nourished, well groomed. No apparent distress. HEENT:  Normocephalic, atraumatic. Pupils equal, round, reactive to light. No scleral icterus. No conjunctival injection. Normal lips, teeth, and gums. No nasal discharge. Neck:  Supple  Heart:  RRR, no murmurs, gallops, rubs  Lungs:  CTA bilaterally, bilat symmetrical expansion, no wheeze, rales, or rhonchi  Abdomen:   Bowel sounds present, soft, + suprapubic tender, no masses, no organomegaly, no peritoneal signs  Extremities:  No clubbing, cyanosis, or edema  Skin:  Warm and dry, no open lesions or rash  Neuro:  Cranial nerves 2-12 intact, no focal deficits  Breast: deferred  Rectal: deferred  Genitalia:  deferred    LABS:    CBC with Differential:    Lab Results   Component Value Date    WBC 19.0 11/05/2020    RBC 3.25 11/05/2020    HGB 8.1 11/05/2020    HCT 25.4 11/05/2020     11/05/2020    MCV 78.2 11/05/2020    MCH 24.9 11/05/2020    MCHC 31.9 11/05/2020    RDW 15.9 11/05/2020    LYMPHOPCT 7.0 11/05/2020    MONOPCT 6.6 11/05/2020    BASOPCT 0.4 11/05/2020    MONOSABS 1.25 11/05/2020    LYMPHSABS 1.34 11/05/2020    EOSABS 0.24 11/05/2020    BASOSABS 0.08 11/05/2020     CMP:    Lab Results   Component Value Date     11/05/2020    K 4.0 11/05/2020    CL 96 11/05/2020    CO2 24 11/05/2020    BUN 24 11/05/2020    CREATININE 1.1 11/05/2020    GFRAA >60 11/05/2020    LABGLOM >60 11/05/2020    GLUCOSE 265 11/05/2020    PROT 6.4 11/05/2020    LABALBU 2.7 11/05/2020    CALCIUM 8.0 11/05/2020    BILITOT 0.4 11/05/2020    ALKPHOS 124 11/05/2020    AST 30 11/05/2020    ALT 10 11/05/2020     Magnesium:  No results found for: MG  Phosphorus:  No results found for: PHOS  Troponin:  No results found for: TROPONINI  U/A:    Lab Results   Component Value Date    COLORU FARTUN 11/05/2020    PROTEINU >=300 11/05/2020    PHUR 6.5 11/05/2020    [address] >20 11/05/2020    RBCUA PACKED 11/05/2020    BACTERIA MODERATE 11/05/2020    CLARITYU CLOUDY 11/05/2020    SPECGRAV 1.020 11/05/2020    LEUKOCYTESUR LARGE 11/05/2020    UROBILINOGEN 2.0 11/05/2020    BILIRUBINUR MODERATE 11/05/2020    BLOODU LARGE 11/05/2020    GLUCOSEU 100 11/05/2020     HgBA1c:  No results found for: LABA1C  FLP:  No results found for: TRIG, HDL, LDLCALC, LDLDIRECT, LABVLDL  TSH:  No results found for: TSH    ASSESSMENT:      Principal Problem:    Hematuria, gross  Active Problems:    Acute blood loss anemia    Hypertension    DM (diabetes mellitus), type 2 ([address]    Chronic atrial fibrillation ([facility] [address]  Resolved Problems:    * No resolved hospital problems.  *      PLAN:    80-year-old male history of A. fib on Eliquis and Tambocor, BPH status post recent TURP, diabetes admitted with gross hematuria possible UTI--plan for cystoscopy today    IV antibiotics  Urine culture  Blood culture  Monitor labs  Insulins adjusted  Hold AC  Continue Tambocor  Medications for other co morbidities cont as appropriate w dosage adjustments as necessary   PT/OT  DVT PPx  DC planning     Thank you for allowing me to participate in the care of this patient.       Note that over 50 minutes was spent in evaluation of the patient, review of the chart and pertinent records, discussion with family/staff, etc    Alexander Posadas MD  8:56 AM  11/6/2020

## 2020-11-06 NOTE — PROGRESS NOTES
Called by nurse that mcduffie was not draining and unable to irrigate. Pt writhing in pain. I was able to irrigate the mcduffie catheter with 700cc saline. Several large clots were removed. Urine at the end was light pink to dark yellow. Pt with much relief. PLAN:     NPO at midnight. Will reassess in the morning. Hold eliquis. B&O suppository prn.

## 2020-11-06 NOTE — PROGRESS NOTES
11/6/2020 10:55 AM  Service: Urology  Group: [facility] (Samuel/Monika/Demetrius)    Jacqueline Nino  [id number]    Subjective:    Laying in bed  Complains of suprapubic discomfort   CBI going in at moderate rate, urine is pink, small amount of output  He is frustrated   No family is present     Review of Systems  Constitutional: No fever or chills   Respiratory: negative for cough and hemoptysis  Cardiovascular: negative for chest pain and dyspnea  Gastrointestinal:positive for abdominal pain   : See above  Derm: negative for rash and skin lesion(s)  Neurological: negative for seizures and tremors  Musculoskeletal: Negative    Psychiatric: Negative   All other reviews are negative      Scheduled Meds:   cefTRIAXone (ROCEPHIN) IV  1 g Intravenous Q24H    atorvastatin  20 mg Oral Nightly    famotidine  20 mg Oral BID    flecainide  50 mg Oral BID    rOPINIRole  0.25 mg Oral Nightly    sacubitril-valsartan  1 tablet Oral BID    insulin lispro  0-6 Units Subcutaneous TID WC    insulin lispro  0-3 Units Subcutaneous Nightly       Objective:  Vitals:    11/06/20 0730   BP: 128/61   Pulse: 76   Resp: 16   Temp: 97.8 °F (36.6 °C)   SpO2: 97%         Allergies: Patient has no known allergies. General Appearance: alert and oriented to person, place and time and in no acute distress  Skin: no rash or erythema  Head: normocephalic and atraumatic  Pulmonary/Chest: normal air movement, no respiratory distress  Abdomen: soft, non-tender, non-distended  Genitourinary: Mcduffie catheter draining aura urine, low output, leaking around mcduffie catheter.  Suprapubic discomfort   Extremities: no cyanosis, clubbing or edema         Labs:     Recent Labs     11/05/20  0328   *   K 4.0   CL 96*   CO2 24   BUN 24*   CREATININE 1.1   GLUCOSE 265*   CALCIUM 8.0*       Lab Results   Component Value Date    HGB 7.4 11/06/2020    HCT 24.6 11/06/2020         Assessment/Plan:  BPH S/P TURP 10/26/20  Gross hematuria   Clot retention UA reviewed  Urine Cx pending   Cont Rocephin   His CBI was seen going in at moderate rate, minimal output. His mcduffie was hand irrigated for multiple clot retrieval and then became difficult to irrigate again. He is leaking around the mcduffie. Talked about switching to a simplastic mcduffie catheter vs OR for cystoscopy. He prefers OR due to his ongoing gross hematuria.    Keep NPO  Stop CBI  Will take to OR today for cystoscopy, clot evacuation, mcduffie catheter replacement   Risks/benefits explained  We will cont to follow       ZULLY Sevilla - CNP   [facility]

## 2020-11-06 NOTE — CARE COORDINATION
11/6 Update CM Note: Patient NPO for surgery today due to large clots/mcduffie unable to irrigate and increased pain. Patient is post turp of October 26th. Plan remains to home at discharge. Will follow post procedure for home going needs.  Amalia Almanzar RN CM

## 2020-11-06 NOTE — BRIEF OP NOTE
Brief Postoperative Note      Patient: Robbin Tolbert  YOB: 1940  MRN: [id number]    Date of Procedure: 11/6/2020    Pre-Op Diagnosis: .  Clot retention status post TURP    Post-Op Diagnosis: Retention status post TURP. 20 cc of clot. Procedure(s):  CYSTOSCOPY, CLOT EVACUATION, CHATTERJEE CATHETER PLACEMENT    Surgeon(s):  Felicitas Jeffers MD    Assistant:      Anesthesia: Monitor Anesthesia Care and a B&O suppository at the end of the case    Estimated Blood Loss (mL): Minimal less than 20 cc    Complications: None    Specimens:   None    Implants: None      Drains:   Urethral Catheter Double-lumen (Active)   Catheter Indications Urology/Urologist seeing this patient or inserted indwelling catheter 11/06/20 0730   Site Assessment Urethral drainage;Edema 11/06/20 0730   Urine Color Pink 11/06/20 0730   Urine Appearance Clear 11/06/20 0730   Output (mL) 825 mL 11/05/20 1026       Findings: Patulous prostatic fossa with no obvious bleeding sites.   No bleeding sites in the bladder as well    Electronically signed by Felicitas Jeffers MD on 11/6/2020 at 1:50 PM

## 2020-11-06 NOTE — ANESTHESIA PRE PROCEDURE
Department of Anesthesiology  Preprocedure Note       Name:  Chelle Herzog   Age:  [de-identified] y.o.  :  1940                                          MRN:  [id number]         Date:  2020      Surgeon: Madisyn Dick):  Kg Baker MD    Procedure: Procedure(s):  CYSTO    Medications prior to admission:   Prior to Admission medications    Medication Sig Start Date End Date Taking?  Authorizing Provider   flecainide (TAMBOCOR) 50 MG tablet Take 50 mg by mouth 2 times daily Do not know mg   Yes Historical Provider, MD   ferrous sulfate (FE TABS 325) 325 (65 Fe) MG EC tablet Take 325 mg by mouth 2 times daily (with meals)   Yes Historical Provider, MD   sacubitril-valsartan (ENTRESTO) 49-51 MG per tablet Take 1 tablet by mouth 2 times daily   Yes Historical Provider, MD   famotidine (PEPCID) 20 MG tablet Take 20 mg by mouth 2 times daily   Yes Historical Provider, MD   apixaban (ELIQUIS) 5 MG TABS tablet Take by mouth 2 times daily   Yes Historical Provider, MD   atorvastatin (LIPITOR) 20 MG tablet Take 20 mg by mouth daily   Yes Historical Provider, MD   rOPINIRole (REQUIP) 0.25 MG tablet Take 0.25 mg by mouth nightly   Yes Historical Provider, MD   Insulin Glargine (TOUJEO MAX SOLOSTAR SC) Inject 60 Units into the skin nightly    Yes Historical Provider, MD   aspirin 81 MG tablet Take 81 mg by mouth daily   Yes Historical Provider, MD   vitamin B-12 (CYANOCOBALAMIN) 1000 MCG tablet Take 1,000 mcg by mouth daily   Yes Historical Provider, MD   lisinopril (PRINIVIL;ZESTRIL) 10 MG tablet Take 5 mg by mouth daily    Yes Historical Provider, MD       Current medications:    Current Facility-Administered Medications   Medication Dose Route Frequency Provider Last Rate Last Dose    fentaNYL (SUBLIMAZE) injection 100 mcg  100 mcg Intravenous Once Noemy Vieira MD        opium-belladonna (B&O SUPPRETTES) 16.2-60 MG suppository 60 mg  60 mg Rectal Q8H PRN Kaiden Frank MD   60 mg at 20    lactated ringers infusion   Intravenous Continuous Ximena Salomon  mL/hr at 11/06/20 1230      oxyCODONE-acetaminophen (PERCOCET) 5-325 MG per tablet 1 tablet  1 tablet Oral Q4H PRN Ximena Salomon MD   1 tablet at 11/05/20 2207    cefTRIAXone (ROCEPHIN) 1 g in sterile water 10 mL IV syringe  1 g Intravenous Q24H Ximena Salomon MD   1 g at 11/06/20 0967    atorvastatin (LIPITOR) tablet 20 mg  20 mg Oral Nightly Shelah Barthel, PA   20 mg at 11/05/20 2205    famotidine (PEPCID) tablet 20 mg  20 mg Oral BID Shelah Barthel, PA   Stopped at 11/06/20 3656    flecainide (TAMBOCOR) tablet 50 mg  50 mg Oral BID Shelah Barthel, [place]   Stopped at 11/06/20 1348    rOPINIRole (REQUIP) tablet 0.25 mg  0.25 mg Oral Nightly Shelah Barthel, PA   0.25 mg at 11/05/20 2206    sacubitril-valsartan (ENTRESTO) 49-51 MG per tablet 1 tablet  1 tablet Oral BID Shelah Barthel, PA   Stopped at 11/06/20 7746    insulin lispro (HUMALOG) injection vial 0-6 Units  0-6 Units Subcutaneous TID WC Shelah Barthel, PA   Stopped at 11/06/20 9865    insulin lispro (HUMALOG) injection vial 0-3 Units  0-3 Units Subcutaneous Nightly Shelah Barthel, PA        glucose (GLUTOSE) 40 % oral gel 15 g  15 g Oral PRN Shelah Barthel, PA        dextrose 50 % IV solution  12.5 g Intravenous PRN Shelah Barthel, PA        glucagon (rDNA) injection 1 mg  1 mg Intramuscular PRN Shelah Barthel, PA        dextrose 5 % solution  100 mL/hr Intravenous PRN Shelah Barthel, PA           Allergies:  No Known Allergies    Problem List:    Patient Active Problem List   Diagnosis Code    Right cataract H26.9    Left eye complaint H57.9    Hematuria, gross R31.0    Acute blood loss anemia D62    Hypertension I10    DM (diabetes mellitus), type 2 ([facility] E11.9       Past Medical History:        Diagnosis Date    CAD (coronary artery disease)     Cancer ([facility] [address] [address]     skin     Diabetes mellitus ([address]     GERD (gastroesophageal reflux disease)     Hyperlipidemia     Hypertension     [facility] infarction [facility]) 1999       Past Surgical History:        Procedure Laterality Date    CATARACT REMOVAL Left 06/05/2018    Cataract Extraction with IOL Left Eye    CATARACT REMOVAL WITH IMPLANT Right 01/04/2018    KS XCAPSL CTRC RMVL INSJ IO LENS PROSTH W/O ECP Left 6/5/2018    CATARACT EXTRACTION WITH IOL LEFT EYE performed by Zac Paredes MD at [facility]         Social History:    Social History     Tobacco Use    Smoking status: Former Smoker     Packs/day: 25.00     Types: Cigarettes    Smokeless tobacco: Never Used   Substance Use Topics    Alcohol use: No                                Counseling given: Not Answered      Vital Signs (Current):   Vitals:    11/05/20 0845 11/05/20 2000 11/06/20 0730 11/06/20 1315   BP: (!) 140/64 (!) 150/65 128/61 (!) 181/71   Pulse: 76 84 76 79   Resp: 16 18 16 24   Temp: 98.4 °F (36.9 °C) 99.2 °F (37.3 °C) 97.8 °F (36.6 °C)    TempSrc: Temporal Temporal Temporal    SpO2: 93% 97% 97% 98%   Weight: 185 lb 10 oz (84.2 kg)      Height: 5' 8\" (1.727 m)                                                 BP Readings from Last 3 Encounters:   11/06/20 (!) 181/71   06/05/18 (!) 170/98   06/05/18 (!) 166/104       NPO Status:                                                                                 BMI:   Wt Readings from Last 3 Encounters:   11/05/20 185 lb 10 oz (84.2 kg)   06/05/18 183 lb (83 kg)   01/04/18 182 lb (82.6 kg)     Body mass index is 28.22 kg/m².     CBC:   Lab Results   Component Value Date    WBC 15.0 11/06/2020    RBC 3.07 11/06/2020    HGB 7.4 11/06/2020    HCT 24.6 11/06/2020    MCV 80.1 11/06/2020    RDW 16.0 11/06/2020     11/06/2020       CMP:   Lab Results   Component Value Date     11/06/2020    K 3.6 11/06/2020     11/06/2020    CO2 25 11/06/2020    BUN 16 11/06/2020    CREATININE 0.8 11/06/2020    GFRAA >60 11/06/2020    LABGLOM >60 11/06/2020    GLUCOSE 135 11/06/2020    PROT 6.4 11/05/2020    CALCIUM 8.0 11/06/2020    BILITOT 0.4 11/05/2020    ALKPHOS 124 11/05/2020    AST 30 11/05/2020    ALT 10 11/05/2020       POC Tests: No results for input(s): POCGLU, POCNA, POCK, POCCL, POCBUN, POCHEMO, POCHCT in the last 72 hours. Coags:   Lab Results   Component Value Date    PROTIME 16.6 11/05/2020    INR 1.5 11/05/2020       HCG (If Applicable): No results found for: PREGTESTUR, PREGSERUM, HCG, HCGQUANT     ABGs: No results found for: PHART, PO2ART, EXE0EHE, CNC8FWB, BEART, S3CPMEFV     Type & Screen (If Applicable):  No results found for: LABABO, [address]    Anesthesia Evaluation  Patient summary reviewed no history of anesthetic complications:   Airway: Mallampati: II  TM distance: >3 FB   Neck ROM: full  Mouth opening: > = 3 FB Dental:    (+) upper dentures and lower dentures      Pulmonary: breath sounds clear to auscultation      (-) COPD and asthma                          ROS comment: Former smoker   Cardiovascular:  Exercise tolerance: good (>4 METS),   (+) hypertension: moderate, past MI:, CAD:,         Rhythm: regular  Rate: normal                    Neuro/Psych:   Negative Neuro/Psych ROS              GI/Hepatic/Renal:   (+) GERD: well controlled,           Endo/Other:    (+) DiabetesType II DM, well controlled, , .    (-) hypothyroidism               Abdominal:         (-) obese     Vascular: negative vascular ROS. Anesthesia Plan      MAC     ASA 3       Induction: intravenous. Anesthetic plan and risks discussed with patient and spouse. Plan discussed with CRNA. Florentino Verdugo MD   11/6/2020        DOS STAFF ADDENDUM:    Pt seen and examined, chart reviewed (including anesthesia, drug and allergy history). Anesthetic plan, risks, benefits, alternatives, and personnel involved discussed with patient. Patient verbalized an understanding and agrees to proceed.   Plan discussed with

## 2020-11-06 NOTE — PROGRESS NOTES
This nurse irrigated mcduffie with 400cc of Saline several small string like clots pieces removed, urine remains slightly pink tinged, pt tolerated well with minimal complaints.

## 2020-11-06 NOTE — ANESTHESIA POSTPROCEDURE EVALUATION
Department of Anesthesiology  Postprocedure Note    Patient: Fanta Davis  MRN: [id number]  YOB: 1940  Date of evaluation: 11/6/2020  Time:  3:39 PM     Procedure Summary     Date:  11/06/20 Room / Location:  [facility] OR 11 / [facility]    Anesthesia Start:   Anesthesia Stop:      Procedure:  CYSTOSCOPY, CLOT EVACUATION, CHATTERJEE CATHETER PLACEMENT (N/A ) Diagnosis:  (.)    Surgeon:  Yashira Armstrong MD Responsible Provider:      Anesthesia Type:  MAC ASA Status:  3          Anesthesia Type: MAC    Socar Phase I: Oscar Score: 10    Oscar Phase II:      Last vitals: Reviewed and per EMR flowsheets.        Anesthesia Post Evaluation    Patient location during evaluation: PACU  Patient participation: complete - patient participated  Level of consciousness: awake  Pain score: 3  Airway patency: patent  Nausea & Vomiting: no nausea and no vomiting  Complications: no  Cardiovascular status: blood pressure returned to baseline  Respiratory status: acceptable  Hydration status: euvolemic

## 2020-11-07 LAB
ABO/RH: NORMAL
ANION GAP SERPL CALCULATED.3IONS-SCNC: 10 MMOL/L (ref 7–16)
ANTIBODY SCREEN: NORMAL
BASOPHILS ABSOLUTE: 0.05 E9/L (ref 0–0.2)
BASOPHILS RELATIVE PERCENT: 0.2 % (ref 0–2)
BUN BLDV-MCNC: 23 MG/DL (ref 8–23)
CALCIUM SERPL-MCNC: 7.6 MG/DL (ref 8.6–10.2)
CHLORIDE BLD-SCNC: 100 MMOL/L (ref 98–107)
CO2: 23 MMOL/L (ref 22–29)
CREAT SERPL-MCNC: 1 MG/DL (ref 0.7–1.2)
EOSINOPHILS ABSOLUTE: 0.37 E9/L (ref 0.05–0.5)
EOSINOPHILS RELATIVE PERCENT: 1.8 % (ref 0–6)
GFR AFRICAN AMERICAN: >60
GFR NON-AFRICAN AMERICAN: >60 ML/MIN/1.73
GLUCOSE BLD-MCNC: 192 MG/DL (ref 74–99)
HCT VFR BLD CALC: 22.1 % (ref 37–54)
HEMOGLOBIN: 6.9 G/DL (ref 12.5–16.5)
IMMATURE GRANULOCYTES #: 0.25 E9/L
IMMATURE GRANULOCYTES %: 1.2 % (ref 0–5)
LYMPHOCYTES ABSOLUTE: 0.82 E9/L (ref 1.5–4)
LYMPHOCYTES RELATIVE PERCENT: 3.9 % (ref 20–42)
MCH RBC QN AUTO: 24.6 PG (ref 26–35)
MCHC RBC AUTO-ENTMCNC: 31.2 % (ref 32–34.5)
MCV RBC AUTO: 78.9 FL (ref 80–99.9)
METER GLUCOSE: 125 MG/DL (ref 74–99)
METER GLUCOSE: 168 MG/DL (ref 74–99)
METER GLUCOSE: 202 MG/DL (ref 74–99)
METER GLUCOSE: 253 MG/DL (ref 74–99)
MONOCYTES ABSOLUTE: 0.82 E9/L (ref 0.1–0.95)
MONOCYTES RELATIVE PERCENT: 3.9 % (ref 2–12)
NEUTROPHILS ABSOLUTE: 18.55 E9/L (ref 1.8–7.3)
NEUTROPHILS RELATIVE PERCENT: 89 % (ref 43–80)
PDW BLD-RTO: 16.4 FL (ref 11.5–15)
PLATELET # BLD: 404 E9/L (ref 130–450)
PMV BLD AUTO: 11.2 FL (ref 7–12)
POTASSIUM SERPL-SCNC: 3.7 MMOL/L (ref 3.5–5)
RBC # BLD: 2.8 E12/L (ref 3.8–5.8)
SODIUM BLD-SCNC: 133 MMOL/L (ref 132–146)
WBC # BLD: 20.9 E9/L (ref 4.5–11.5)

## 2020-11-07 PROCEDURE — 6370000000 HC RX 637 (ALT 250 FOR IP): Performed by: UROLOGY

## 2020-11-07 PROCEDURE — 82962 GLUCOSE BLOOD TEST: CPT

## 2020-11-07 PROCEDURE — 6370000000 HC RX 637 (ALT 250 FOR IP): Performed by: PHYSICIAN ASSISTANT

## 2020-11-07 PROCEDURE — 87040 BLOOD CULTURE FOR BACTERIA: CPT

## 2020-11-07 PROCEDURE — 1200000000 HC SEMI PRIVATE

## 2020-11-07 PROCEDURE — 87088 URINE BACTERIA CULTURE: CPT

## 2020-11-07 PROCEDURE — 87077 CULTURE AEROBIC IDENTIFY: CPT

## 2020-11-07 PROCEDURE — 86923 COMPATIBILITY TEST ELECTRIC: CPT

## 2020-11-07 PROCEDURE — 80048 BASIC METABOLIC PNL TOTAL CA: CPT

## 2020-11-07 PROCEDURE — 85025 COMPLETE CBC W/AUTO DIFF WBC: CPT

## 2020-11-07 PROCEDURE — 87186 SC STD MICRODIL/AGAR DIL: CPT

## 2020-11-07 PROCEDURE — 36430 TRANSFUSION BLD/BLD COMPNT: CPT

## 2020-11-07 PROCEDURE — 86850 RBC ANTIBODY SCREEN: CPT

## 2020-11-07 PROCEDURE — 86901 BLOOD TYPING SEROLOGIC RH(D): CPT

## 2020-11-07 PROCEDURE — P9016 RBC LEUKOCYTES REDUCED: HCPCS

## 2020-11-07 PROCEDURE — 2580000003 HC RX 258: Performed by: UROLOGY

## 2020-11-07 PROCEDURE — 97161 PT EVAL LOW COMPLEX 20 MIN: CPT

## 2020-11-07 PROCEDURE — 36415 COLL VENOUS BLD VENIPUNCTURE: CPT

## 2020-11-07 PROCEDURE — 86900 BLOOD TYPING SEROLOGIC ABO: CPT

## 2020-11-07 PROCEDURE — 6360000002 HC RX W HCPCS: Performed by: UROLOGY

## 2020-11-07 PROCEDURE — 97530 THERAPEUTIC ACTIVITIES: CPT

## 2020-11-07 RX ORDER — FUROSEMIDE 10 MG/ML
10 INJECTION INTRAMUSCULAR; INTRAVENOUS ONCE
Status: COMPLETED | OUTPATIENT
Start: 2020-11-07 | End: 2020-11-08

## 2020-11-07 RX ORDER — ACETAMINOPHEN 325 MG/1
650 TABLET ORAL EVERY 4 HOURS PRN
Status: DISCONTINUED | OUTPATIENT
Start: 2020-11-07 | End: 2020-11-11 | Stop reason: HOSPADM

## 2020-11-07 RX ORDER — DIPHENHYDRAMINE HCL 25 MG
25 TABLET ORAL NIGHTLY PRN
Status: DISCONTINUED | OUTPATIENT
Start: 2020-11-08 | End: 2020-11-11 | Stop reason: HOSPADM

## 2020-11-07 RX ORDER — DIPHENHYDRAMINE HCL 25 MG
TABLET ORAL
Status: COMPLETED
Start: 2020-11-07 | End: 2020-11-08

## 2020-11-07 RX ORDER — 0.9 % SODIUM CHLORIDE 0.9 %
20 INTRAVENOUS SOLUTION INTRAVENOUS ONCE
Status: COMPLETED | OUTPATIENT
Start: 2020-11-07 | End: 2020-11-08

## 2020-11-07 RX ADMIN — WATER 1 G: 1 INJECTION INTRAMUSCULAR; INTRAVENOUS; SUBCUTANEOUS at 06:37

## 2020-11-07 RX ADMIN — SODIUM CHLORIDE 20 ML: 9 INJECTION, SOLUTION INTRAVENOUS at 21:00

## 2020-11-07 RX ADMIN — FAMOTIDINE 20 MG: 20 TABLET ORAL at 21:21

## 2020-11-07 RX ADMIN — FLECAINIDE ACETATE 50 MG: 100 TABLET ORAL at 21:21

## 2020-11-07 RX ADMIN — INSULIN LISPRO 2 UNITS: 100 INJECTION, SOLUTION INTRAVENOUS; SUBCUTANEOUS at 17:07

## 2020-11-07 RX ADMIN — SACUBITRIL AND VALSARTAN 1 TABLET: 49; 51 TABLET, FILM COATED ORAL at 08:36

## 2020-11-07 RX ADMIN — OXYCODONE AND ACETAMINOPHEN 1 TABLET: 5; 325 TABLET ORAL at 03:37

## 2020-11-07 RX ADMIN — DIPHENHYDRAMINE HYDROCHLORIDE 25 MG: 25 TABLET ORAL at 23:49

## 2020-11-07 RX ADMIN — SACUBITRIL AND VALSARTAN 1 TABLET: 49; 51 TABLET, FILM COATED ORAL at 21:21

## 2020-11-07 RX ADMIN — ROPINIROLE HYDROCHLORIDE 0.25 MG: 0.25 TABLET, FILM COATED ORAL at 21:21

## 2020-11-07 RX ADMIN — FLECAINIDE ACETATE 50 MG: 100 TABLET ORAL at 08:36

## 2020-11-07 RX ADMIN — ACETAMINOPHEN 650 MG: 325 TABLET ORAL at 15:41

## 2020-11-07 RX ADMIN — INSULIN LISPRO 2 UNITS: 100 INJECTION, SOLUTION INTRAVENOUS; SUBCUTANEOUS at 21:21

## 2020-11-07 RX ADMIN — ATORVASTATIN CALCIUM 20 MG: 20 TABLET, FILM COATED ORAL at 21:21

## 2020-11-07 RX ADMIN — INSULIN LISPRO 1 UNITS: 100 INJECTION, SOLUTION INTRAVENOUS; SUBCUTANEOUS at 13:11

## 2020-11-07 RX ADMIN — FAMOTIDINE 20 MG: 20 TABLET ORAL at 08:36

## 2020-11-07 ASSESSMENT — PAIN SCALES - GENERAL
PAINLEVEL_OUTOF10: 5
PAINLEVEL_OUTOF10: 0
PAINLEVEL_OUTOF10: 2

## 2020-11-07 NOTE — PLAN OF CARE
Problem: Bleeding:  Goal: Will show no signs and symptoms of excessive bleeding  Description: Will show no signs and symptoms of excessive bleeding  Outcome: Met This Shift     Problem: Pain:  Goal: Pain level will decrease  Description: Pain level will decrease  Outcome: Met This Shift     Problem: Pain:  Goal: Control of acute pain  Description: Control of acute pain  Outcome: Met This Shift     Problem: Pain:  Goal: Control of chronic pain  Description: Control of chronic pain  Outcome: Met This Shift     Problem: Skin Integrity:  Goal: Will show no infection signs and symptoms  Description: Will show no infection signs and symptoms  Outcome: Met This Shift     Problem: Skin Integrity:  Goal: Absence of new skin breakdown  Description: Absence of new skin breakdown  Outcome: Met This Shift

## 2020-11-07 NOTE — PROGRESS NOTES
Physical Therapy  Physical Therapy Initial Assessment     Name: Carlos Palma  : 1940  MRN: [id number]    Referring Provider:  Ridge Nick    Date of Service: 2020    Evaluating PT:  Perla Rich DPT [id number]      Room #:  3033/9811-S  Diagnosis:  Hematuria   PMHx/PSHx:  CA, MI, CAD, DM, HTN  Procedure/Surgery:  20 Cystopanendoscopy, clot evacuation, and Mcduffie catheter placement  Precautions:  Falls, mcduffie  Equipment Needs:  [employer]    SUBJECTIVE:    Pt lives alone but his son will assist home in a mobile home with ramped entry. Pt ambulated with no AD independently PTA. OBJECTIVE:   Initial Evaluation  Date: 20 Treatment Short Term/ Long Term   Goals   AM-PAC 6 Clicks /     Was pt agreeable to Eval/treatment? yes     Does pt have pain? No c/o pain     Bed Mobility  Sup all aspects  Rolling: Independent  Supine to sit: Independent  Sit to supine: Independent  Scooting: Independent   Transfers Sit to stand: Sup  Stand to sit: Sup  Stand pivot: Sup with [employer]  Sit to stand: Independent  Stand to sit: Independent  Stand pivot: Mod I with [employer]   Ambulation    75 feet with [employer] Sup  150 feet with [employer] Mod I   Stair negotiation: ascended and descended NT  NA   ROM BLE:  WFL     Strength BLE:  5/5     Balance Sitting EOB:  Sup  Dynamic Standing:  Sup with [employer]  Sitting EOB:  Independent  Dynamic Standing: Mod I with [employer]     Pt is A & O x 3  Sensation:  Pt denies numbness and tingling to extremities  Edema:  None noted      Therapeutic Exercises:  Function as noted above    Patient education  Pt educated on safety with functional mobility, using a [employer] at home for improved safety/stability    Patient response to education:   Pt verbalized understanding Pt demonstrated skill Pt requires further education in this area   yes yes reinforce     ASSESSMENT:    Comments:  Pt was found lying in bed and agreeable to eval. Pt required increased time to complete bed mobility.  Pt ambulated with a steady gait and did report dizziness at times when he would tunr his head too rapidly. Dizziness would resolve within a few seconds. Pt's son observed entire session. Pt did not report any concerns with returning home. Advised pt and pt's son that pt should use a WW at home to improve stability/safety. Pt was left with RN and son in room with call light in reach and all needs met. Treatment:  Patient practiced and was instructed in the following treatment:     Therapeutic activities:  - Bed mobility training to progress toward max independence and function  - Transfer/ambulation training to progress toward max independence and function  Education provided to family that pt would benefit from supervision initially when returning home. Pt's/ family goals   1. To go home    Patient and or family understand(s) diagnosis, prognosis, and plan of care. yes    PLAN OF CARE:    Current Treatment Recommendations     [x] Strengthening     [] ROM   [x] Balance Training   [x] Endurance Training   [x] Transfer Training   [x] Gait Training   [] Stair Training   [] Positioning   [x] Safety and Education Training   [x] Patient/Caregiver Education   [] HEP  [] Other     Frequency of treatments: 2-5x/week x 1-2 weeks. Time in  1350  Time out  1405    Total Treatment Time  15 minutes     Evaluation Time includes thorough review of current medical information, gathering information on past medical history/social history and prior level of function, completion of standardized testing/informal observation of tasks, assessment of data and education on plan of care and goals.     CPT codes:  [x] Low Complexity PT evaluation 23781  [] Moderate Complexity PT evaluation 26733  [] High Complexity PT evaluation 13003  [] PT Re-evaluation 11016  [] Gait training 85446  minutes  [] Manual therapy 78357  minutes  [x] Therapeutic activities 28947 10 minutes  [] Therapeutic exercises 82589  minutes  [] Neuromuscular reeducation 08853  minutes     Ramon Huizar DPT  [id number]

## 2020-11-07 NOTE — OP NOTE
[address]                  [address] [place],  [facility]                                OPERATIVE REPORT    PATIENT NAME: Larry Das                    :        1940  MED REC NO:   [id number]                            ROOM:       8402  ACCOUNT NO:   [de-identified]                           ADMIT DATE: 2020  PROVIDER:     Cookie Salas MD    DATE OF PROCEDURE:  2020    PREOPERATIVE DIAGNOSES:  Clot retention and status post TURP. POSTOPERATIVE DIAGNOSIS:  Retention of 20 mL of clot. OPERATIONS:  Cystopanendoscopy, clot evacuation, and Champion catheter  placement. ANESTHESIA:  Monitored sedation and a B and O suppository. BLOOD LOSS:  Less than 20 mL. COMPLICATIONS:  None. SPECIMEN:  None. SURGEON:  Cookie Salas MD    OPERATIVE PROCEDURE:  The timeout was read by me, the Anesthesia, and  the operating staff; reviewed history, physical, allergy, and  medication. The patient is on antibiotics, preoperative, on the floor. No additional medications were needed. Placed in the lithotomy  position, prepped and draped in the usual fashion. He has some meatal  erosion to the corona. The urethroscopy demonstrates no strictures,  false passages, abrasions, ulcerations, cystic or solid lesions. Verumontanum is intact. The prostatic fossa was patulous. There were  no obvious bleeding sites. It looked like he was already beginning to  heal, status post TURP on 10/26/2020. He had a small volume of clot  about 20 mL. It was aspirated. The bladder was thickened with  trabeculation and cellule, but no diverticular formation. Bladder  capacity is over 300 mL which is normal.  There were no mucosal lesions  compatible with serious inflammatory, premalignant, or obviously  malignant disease. Nothing needed to be biopsied. The trigone was well  developed. Both ureteral orifices appeared to be singular and  nonrefluxing. No need for retrograde pyelograms. After aspirated all  20 mL of clot, there was no further bleeding. I again examined the  prostatic fossa, it was intact. A 22-Lebanese three-way Simplastic catheter inserted with a catheter  guide, 50 mL of water in a 30-mL balloon. Irrigant was totally clear. Rectal:  Good anal tone. No hemorrhoids, no masses, no impaction. B  and O suppository was inserted to decrease bladder spasm. The patient  was sent back to his room in satisfactory condition. ADDENDUM:  No specimen was sent for analysis.         Keila Llanos MD    D: 11/06/2020 15:08:42       T: 11/06/2020 15:18:33     [email or web address]  Job#: [id number]     Doc#: [id number]    CC:  Nelly Mcfarlane MD

## 2020-11-07 NOTE — PROGRESS NOTES
[facility]  PROGRESS NOTE    Chief Complaint:   BPH/Gross hematuria/Clot retention    HPI:   He is feeling much better now. He denies any catheter pain or abdominal discomfort. Vitals:    11/07/20 0200   BP: 136/63   Pulse: 92   Resp: 18   Temp: 98.8 °F (37.1 °C)   SpO2: 96%       Allergies: Patient has no known allergies. PAST MEDICAL HISTORY:   Past Medical History:   Diagnosis Date    CAD (coronary artery disease)     Cancer ([facility]     skin     Diabetes mellitus ([facility]     GERD (gastroesophageal reflux disease)     Hyperlipidemia     Hypertension     Myocardial infarction ([facility] 1999       PAST SURGICAL HISTORY:   Past Surgical History:   Procedure Laterality Date    CATARACT REMOVAL Left 06/05/2018    Cataract Extraction with IOL Left Eye    CATARACT REMOVAL WITH IMPLANT Right 01/04/2018    CYSTOSCOPY N/A 11/6/2020    CYSTOSCOPY, CLOT EVACUATION, CHATTERJEE CATHETER PLACEMENT performed by Nikunj Weber MD at [address] [facility] W/O ECP Left 6/5/2018    CATARACT EXTRACTION WITH IOL LEFT EYE performed by Araceli Sam MD at [address]:  History reviewed. No pertinent family history.     PAST SOCIAL HISTORY:    Social History     Socioeconomic History    Marital status:      Spouse name: None    Number of children: None    Years of education: None    Highest education level: None   Occupational History    None   Social Needs    Financial resource strain: None    Food insecurity     Worry: None     Inability: None    Transportation needs     Medical: None     Non-medical: None   Tobacco Use    Smoking status: Former Smoker     Packs/day: 25.00     Types: Cigarettes    Smokeless tobacco: Never Used   Substance and Sexual Activity    Alcohol use: No    Drug use: None    Sexual activity: None   Lifestyle    Physical activity     Days per week: None     Minutes per session: None    Stress: None CBI.  Manually irrigate the Champion as needed with the piston syringe to maintain patency  -Continue to hold anticoagulation for now as he remains high risk for recurrent bleeding from his prostate gland  -Urine culture on 11/5/2020 was negative  -Pathology report from the TURP specimen shows 71 g of benign prostate tissue which we discussed in detail  -Stable for discharge with Champion to a leg bag.   He has an appointment in our office on the morning on 11/10/2020 for catheter removal      Mikayla Tatum MD  11/7/2020  5:47 AM

## 2020-11-08 LAB
BLOOD BANK DISPENSE STATUS: NORMAL
BLOOD BANK DISPENSE STATUS: NORMAL
BLOOD BANK PRODUCT CODE: NORMAL
BLOOD BANK PRODUCT CODE: NORMAL
BPU ID: NORMAL
BPU ID: NORMAL
DESCRIPTION BLOOD BANK: NORMAL
DESCRIPTION BLOOD BANK: NORMAL
HCT VFR BLD CALC: 28.1 % (ref 37–54)
HEMOGLOBIN: 9.1 G/DL (ref 12.5–16.5)
METER GLUCOSE: 201 MG/DL (ref 74–99)
METER GLUCOSE: 209 MG/DL (ref 74–99)
METER GLUCOSE: 235 MG/DL (ref 74–99)
METER GLUCOSE: 259 MG/DL (ref 74–99)

## 2020-11-08 PROCEDURE — 6370000000 HC RX 637 (ALT 250 FOR IP): Performed by: PHYSICIAN ASSISTANT

## 2020-11-08 PROCEDURE — 1200000000 HC SEMI PRIVATE

## 2020-11-08 PROCEDURE — 82962 GLUCOSE BLOOD TEST: CPT

## 2020-11-08 PROCEDURE — 97535 SELF CARE MNGMENT TRAINING: CPT

## 2020-11-08 PROCEDURE — 6360000002 HC RX W HCPCS: Performed by: UROLOGY

## 2020-11-08 PROCEDURE — 85014 HEMATOCRIT: CPT

## 2020-11-08 PROCEDURE — 36415 COLL VENOUS BLD VENIPUNCTURE: CPT

## 2020-11-08 PROCEDURE — 6370000000 HC RX 637 (ALT 250 FOR IP): Performed by: UROLOGY

## 2020-11-08 PROCEDURE — 85018 HEMOGLOBIN: CPT

## 2020-11-08 PROCEDURE — 6370000000 HC RX 637 (ALT 250 FOR IP)

## 2020-11-08 PROCEDURE — 2580000003 HC RX 258: Performed by: UROLOGY

## 2020-11-08 PROCEDURE — 97165 OT EVAL LOW COMPLEX 30 MIN: CPT

## 2020-11-08 RX ORDER — FUROSEMIDE 10 MG/ML
10 INJECTION INTRAMUSCULAR; INTRAVENOUS ONCE
Status: COMPLETED | OUTPATIENT
Start: 2020-11-08 | End: 2020-11-08

## 2020-11-08 RX ADMIN — INSULIN LISPRO 2 UNITS: 100 INJECTION, SOLUTION INTRAVENOUS; SUBCUTANEOUS at 21:36

## 2020-11-08 RX ADMIN — ATORVASTATIN CALCIUM 20 MG: 20 TABLET, FILM COATED ORAL at 21:30

## 2020-11-08 RX ADMIN — FUROSEMIDE 10 MG: 20 INJECTION, SOLUTION INTRAMUSCULAR; INTRAVENOUS at 00:45

## 2020-11-08 RX ADMIN — WATER 1 G: 1 INJECTION INTRAMUSCULAR; INTRAVENOUS; SUBCUTANEOUS at 06:29

## 2020-11-08 RX ADMIN — DIPHENHYDRAMINE HCL: 25 TABLET ORAL at 00:07

## 2020-11-08 RX ADMIN — ROPINIROLE HYDROCHLORIDE 0.25 MG: 0.25 TABLET, FILM COATED ORAL at 21:32

## 2020-11-08 RX ADMIN — SACUBITRIL AND VALSARTAN 1 TABLET: 49; 51 TABLET, FILM COATED ORAL at 21:31

## 2020-11-08 RX ADMIN — FAMOTIDINE 20 MG: 20 TABLET ORAL at 21:30

## 2020-11-08 RX ADMIN — INSULIN LISPRO 2 UNITS: 100 INJECTION, SOLUTION INTRAVENOUS; SUBCUTANEOUS at 12:51

## 2020-11-08 RX ADMIN — FUROSEMIDE 10 MG: 10 INJECTION, SOLUTION INTRAVENOUS at 04:30

## 2020-11-08 RX ADMIN — SACUBITRIL AND VALSARTAN 1 TABLET: 49; 51 TABLET, FILM COATED ORAL at 09:34

## 2020-11-08 RX ADMIN — FAMOTIDINE 20 MG: 20 TABLET ORAL at 09:31

## 2020-11-08 RX ADMIN — SODIUM CHLORIDE, POTASSIUM CHLORIDE, SODIUM LACTATE AND CALCIUM CHLORIDE: 600; 310; 30; 20 INJECTION, SOLUTION INTRAVENOUS at 10:32

## 2020-11-08 RX ADMIN — SODIUM CHLORIDE, POTASSIUM CHLORIDE, SODIUM LACTATE AND CALCIUM CHLORIDE: 600; 310; 30; 20 INJECTION, SOLUTION INTRAVENOUS at 18:18

## 2020-11-08 RX ADMIN — INSULIN LISPRO 2 UNITS: 100 INJECTION, SOLUTION INTRAVENOUS; SUBCUTANEOUS at 17:31

## 2020-11-08 RX ADMIN — DIPHENHYDRAMINE HYDROCHLORIDE 25 MG: 25 TABLET ORAL at 21:31

## 2020-11-08 RX ADMIN — FLECAINIDE ACETATE 50 MG: 100 TABLET ORAL at 09:32

## 2020-11-08 RX ADMIN — FLECAINIDE ACETATE 50 MG: 100 TABLET ORAL at 21:31

## 2020-11-08 RX ADMIN — FUROSEMIDE 10 MG: 20 INJECTION, SOLUTION INTRAMUSCULAR; INTRAVENOUS at 04:40

## 2020-11-08 RX ADMIN — INSULIN LISPRO 2 UNITS: 100 INJECTION, SOLUTION INTRAVENOUS; SUBCUTANEOUS at 09:35

## 2020-11-08 ASSESSMENT — PAIN SCALES - GENERAL
PAINLEVEL_OUTOF10: 0
PAINLEVEL_OUTOF10: 0

## 2020-11-08 NOTE — PROGRESS NOTES
OCCUPATIONAL THERAPY INITIAL EVALUATION      Date:2020  Patient Name: Guanako Canseco  MRN: [id number]  : 1940  Room: [address] [place] [address]    Evaluating OT: Oral Viktor, MOT, OTR/L [id number]    AM-PAC Daily Activity Raw Score: 15/24  Recommended Adaptive Equipment: LB AE, TBD    Diagnosis: hematuria   Referring Practitioner: Lv Huffman MD   Surgery:  proctectomy 10/26, 20 Cystopanendoscopy, clot evacuation, and Champion catheter placement  Pertinent Medical History: CAD, CA, DM, HLD, HTN, MI   Precautions:  Falls,     Home Living: Pt lives alone in a mobile home with ramp step(s) to enter; bed/bath on 1 level   Bathroom setup: walk in shower   Equipment owned: shower chair  Prior Level of Function: intermittent assist with ADLs/IADLs; using cane for functional mobility   Driving: yes  Occupation: retired     Pain Level: 0/10  Cognition: A&O: 4/4; Follows 1 step directions, with repetition and increased time   Memory:  fair    Sequencing:  fair    Problem solving:  fair    Judgement/safety:  fair     Functional Assessment:   Initial Eval Status  Date: 20 Treatment Status  Date: Short Term Goals  Treatment frequency: PRN 2-3 x/week   Feeding SUP   IND   Grooming SUP (seated for oral hygiene and facial washing)   IND   UB Dressing SUP   IND   LB Dressing Max A (assist with B socks)  Mod A    Bathing Mod A (simulated)  Min A    Toileting Mod A  Min A   Bed Mobility  Log roll: SUP  Supine to sit: SUP   Sit to supine: SUP   Log roll IND  Supine to sit: IND   Sit to supine: IND   Functional Transfers Sit to stand:sba   Stand to sit:sba  Commode: SBA  SUP   Functional Mobility SBA (using ww, to/from bathroom)  IND   Balance Sitting: SUP  Standing: SBA     Activity Tolerance Fair (slight SOB)     Visual/  Perceptual Glasses: yes              Hand dominance: R  UE ROM: RUE:  WFL  LUE:  WFL  Strength: RUE: grossly 4/5 LUE: grossly 4/5   Strength: B WFL  Fine Motor Coordination:  [facility] overall function [x]   Therapeutic Activity [x]  Therapeutic Exercise  [x]  Visual/Perceptual: []    Delirium prevention/treatment  []   Other:  []    Rehab Potential: Good for established goals, pt. assisted in establishment of goals. LTG: maximize independence with ADLs to return to PLOF    Patient instructed on diagnosis, prognosis/goals and plan of care. Demonstrated fair understanding. [] Malnutrition indicators have been identified and nursing has been notified to ensure a dietitian consult is ordered. Evaluation time includes thorough review of current medical information, gathering information on past medical & social history & PLOF, completion of standardized testing, informal observation of tasks, consultation with other medical professions/disciplines, assessment of data & development of POC/goals.      low Evaluation +     Treatment Time In: 1050        Treatment Time Out: 1100           Treatment Charges: Mins Units   Ther Ex  11599       Manual Therapy 73349       Thera Activities 27300       ADL/Home Mgt 71773 10 1   Neuro Re-ed 28174       Group Therapy        Orthotic manage/training  94447       Non-Billable Time       Total Timed Treatment 10 [address] [place], [place], OTR/L [id number]

## 2020-11-08 NOTE — PROGRESS NOTES
11/8/2020 11:19 AM  Service: Urology  Group: [facility] (Samuel/Monika/Demetrius)    Labmert Bell  [id number]    Subjective:    Sleeping  Arouses to verbal stimuli  Denies any pain or discomfort  Is feeling much better since surgery  States he is ambulating well  Hgb 6.9 yesterday  2 units PRBC received   Tolerating diet  Denies any fever chills  No family present    Review of Systems  Constitutional: No fever or chills, tired  Respiratory: negative for cough and hemoptysis  Cardiovascular: negative for chest pain and dyspnea  Gastrointestinal: negative for abdominal pain, diarrhea, nausea and vomiting   : See above  Derm: negative for rash and skin lesion(s)  Neurological: negative for seizures and tremors  Musculoskeletal: Negative    Psychiatric: Negative   All other reviews are negative      Scheduled Meds:   cefTRIAXone (ROCEPHIN) IV  1 g Intravenous Q24H    atorvastatin  20 mg Oral Nightly    famotidine  20 mg Oral BID    flecainide  50 mg Oral BID    rOPINIRole  0.25 mg Oral Nightly    sacubitril-valsartan  1 tablet Oral BID    insulin lispro  0-6 Units Subcutaneous TID WC    insulin lispro  0-3 Units Subcutaneous Nightly       Objective:  Vitals:    11/08/20 0745   BP: (!) 173/74   Pulse: 84   Resp: 17   Temp: 98.7 °F (37.1 °C)   SpO2: 95%         Allergies: Patient has no known allergies.     General Appearance: Sleeping, arouses to verbal stimuli, follows commands, no acute distress  Skin: no rash or erythema  Head: normocephalic and atraumatic  Pulmonary/Chest: normal air movement, no respiratory distress  Abdomen: soft, non-tender, non-distended  Genitourinary: Catheter intact draining yellow urine  Extremities: no cyanosis, clubbing or edema         Labs:     Recent Labs     11/07/20  1613      K 3.7      CO2 23   BUN 23   CREATININE 1.0   GLUCOSE 192*   CALCIUM 7.6*       Lab Results   Component Value Date    HGB 9.1 11/08/2020    HCT 28.1 11/08/2020     Component  Collected  Lab Urine Culture, Routine Abnormal    11/05/2020  3:28 AM  [address] [facility]    Growth present, evaluating for:   Mixed gram negative rods   Additional growth present, also evaluating for;   Nonhemolytic Strep species    Organism Abnormal    11/05/2020  3:28 AM   - [facility]    Escherichia coli    Urine Culture, Routine  11/05/2020  3:28 AM   - [facility]    >100,000 CFU/ml   Identification and sensitivity to follow    Organism Abnormal    11/05/2020  3:28 AM   - [facility]    Klebsiella oxytoca    Urine Culture, Routine  11/05/2020  3:28 AM   - [facility]    <25,000 CFU/ml   Identification and sensitivity to follow    Organism Abnormal    11/05/2020  3:28 AM   - [facility]    Gram positive cocci    Urine Culture, Routine  11/05/2020  3:28 AM   - [facility]    >100,000 CFU/ml   Identification and sensitivity to follow          Assessment/Plan:  POD#13--S/P Cysto/TURP, POD#2--S/P Cysto/Clot evacuation      Continue to watch the hemoglobin  Transfusions per medical team  CBC and BMP in AM  Blood culture no growh  Urine culture, + E. Coli, Klebsiella, and Gram + organisms, notification and sensitivities pending  Continue the antibiotics, on Rocephin   Repeat cultures pending  Continue to hold the Eliquis for now  TURP pathology benign tissue   Cont the manual irrigations to maintain patency   Cont the mcduffie  He will undergo voiding trial in our office on 11/10   If hemoglobin remains stable in AM and Urine Cx resulted, likely DC in AM     1 Allie Yang, APRN - CNP   [facility]

## 2020-11-08 NOTE — PLAN OF CARE
Problem: Pain:  Goal: Pain level will decrease  Description: Pain level will decrease  11/8/2020 0001 by Jason Pimentel RN  Outcome: Met This Shift     Problem: Pain:  Goal: Control of acute pain  Description: Control of acute pain  11/8/2020 0001 by Jason Pimentel RN  Outcome: Met This Shift     Problem: Skin Integrity:  Goal: Will show no infection signs and symptoms  Description: Will show no infection signs and symptoms  11/8/2020 0001 by Jason Pimentel RN  Outcome: Met This Shift     Problem: Skin Integrity:  Goal: Absence of new skin breakdown  Description: Absence of new skin breakdown  11/8/2020 0001 by Jason Pimentel RN  Outcome: Met This Shift

## 2020-11-09 LAB
ANION GAP SERPL CALCULATED.3IONS-SCNC: 13 MMOL/L (ref 7–16)
BUN BLDV-MCNC: 12 MG/DL (ref 8–23)
CALCIUM SERPL-MCNC: 8.2 MG/DL (ref 8.6–10.2)
CHLORIDE BLD-SCNC: 103 MMOL/L (ref 98–107)
CO2: 22 MMOL/L (ref 22–29)
CREAT SERPL-MCNC: 0.7 MG/DL (ref 0.7–1.2)
GFR AFRICAN AMERICAN: >60
GFR NON-AFRICAN AMERICAN: >60 ML/MIN/1.73
GLUCOSE BLD-MCNC: 145 MG/DL (ref 74–99)
HCT VFR BLD CALC: 30.4 % (ref 37–54)
HEMOGLOBIN: 9.6 G/DL (ref 12.5–16.5)
MCH RBC QN AUTO: 25.1 PG (ref 26–35)
MCHC RBC AUTO-ENTMCNC: 31.6 % (ref 32–34.5)
MCV RBC AUTO: 79.6 FL (ref 80–99.9)
METER GLUCOSE: 135 MG/DL (ref 74–99)
METER GLUCOSE: 163 MG/DL (ref 74–99)
METER GLUCOSE: 226 MG/DL (ref 74–99)
METER GLUCOSE: 244 MG/DL (ref 74–99)
ORGANISM: ABNORMAL
PDW BLD-RTO: 16.2 FL (ref 11.5–15)
PLATELET # BLD: 429 E9/L (ref 130–450)
PMV BLD AUTO: 11.6 FL (ref 7–12)
POTASSIUM SERPL-SCNC: 3.8 MMOL/L (ref 3.5–5)
RBC # BLD: 3.82 E12/L (ref 3.8–5.8)
SODIUM BLD-SCNC: 138 MMOL/L (ref 132–146)
URINE CULTURE, ROUTINE: ABNORMAL
WBC # BLD: 14.7 E9/L (ref 4.5–11.5)

## 2020-11-09 PROCEDURE — 6370000000 HC RX 637 (ALT 250 FOR IP): Performed by: PHYSICIAN ASSISTANT

## 2020-11-09 PROCEDURE — 36415 COLL VENOUS BLD VENIPUNCTURE: CPT

## 2020-11-09 PROCEDURE — 2580000003 HC RX 258: Performed by: REGISTERED NURSE

## 2020-11-09 PROCEDURE — 85027 COMPLETE CBC AUTOMATED: CPT

## 2020-11-09 PROCEDURE — 6360000002 HC RX W HCPCS: Performed by: REGISTERED NURSE

## 2020-11-09 PROCEDURE — 80048 BASIC METABOLIC PNL TOTAL CA: CPT

## 2020-11-09 PROCEDURE — 6370000000 HC RX 637 (ALT 250 FOR IP): Performed by: INTERNAL MEDICINE

## 2020-11-09 PROCEDURE — 6360000002 HC RX W HCPCS: Performed by: UROLOGY

## 2020-11-09 PROCEDURE — 82962 GLUCOSE BLOOD TEST: CPT

## 2020-11-09 PROCEDURE — 2580000003 HC RX 258: Performed by: UROLOGY

## 2020-11-09 PROCEDURE — 1200000000 HC SEMI PRIVATE

## 2020-11-09 RX ORDER — AMLODIPINE BESYLATE 5 MG/1
5 TABLET ORAL DAILY
Status: DISCONTINUED | OUTPATIENT
Start: 2020-11-09 | End: 2020-11-11 | Stop reason: HOSPADM

## 2020-11-09 RX ADMIN — INSULIN LISPRO 1 UNITS: 100 INJECTION, SOLUTION INTRAVENOUS; SUBCUTANEOUS at 10:21

## 2020-11-09 RX ADMIN — FLECAINIDE ACETATE 50 MG: 100 TABLET ORAL at 10:19

## 2020-11-09 RX ADMIN — FAMOTIDINE 20 MG: 20 TABLET ORAL at 22:23

## 2020-11-09 RX ADMIN — SACUBITRIL AND VALSARTAN 1 TABLET: 49; 51 TABLET, FILM COATED ORAL at 22:23

## 2020-11-09 RX ADMIN — AMPICILLIN SODIUM 2 G: 2 INJECTION, POWDER, FOR SOLUTION INTRAMUSCULAR; INTRAVENOUS at 18:00

## 2020-11-09 RX ADMIN — ATORVASTATIN CALCIUM 20 MG: 20 TABLET, FILM COATED ORAL at 22:22

## 2020-11-09 RX ADMIN — WATER 1 G: 1 INJECTION INTRAMUSCULAR; INTRAVENOUS; SUBCUTANEOUS at 06:23

## 2020-11-09 RX ADMIN — INSULIN LISPRO 1 UNITS: 100 INJECTION, SOLUTION INTRAVENOUS; SUBCUTANEOUS at 22:27

## 2020-11-09 RX ADMIN — AMLODIPINE BESYLATE 5 MG: 5 TABLET ORAL at 10:18

## 2020-11-09 RX ADMIN — ROPINIROLE HYDROCHLORIDE 0.25 MG: 0.25 TABLET, FILM COATED ORAL at 22:23

## 2020-11-09 RX ADMIN — INSULIN LISPRO 2 UNITS: 100 INJECTION, SOLUTION INTRAVENOUS; SUBCUTANEOUS at 18:02

## 2020-11-09 RX ADMIN — FLECAINIDE ACETATE 50 MG: 100 TABLET ORAL at 22:23

## 2020-11-09 RX ADMIN — FAMOTIDINE 20 MG: 20 TABLET ORAL at 10:19

## 2020-11-09 RX ADMIN — SACUBITRIL AND VALSARTAN 1 TABLET: 49; 51 TABLET, FILM COATED ORAL at 10:19

## 2020-11-09 ASSESSMENT — PAIN SCALES - GENERAL
PAINLEVEL_OUTOF10: 0
PAINLEVEL_OUTOF10: 0

## 2020-11-09 NOTE — PROGRESS NOTES
Subjective:  Feeling better   No CP or SOB  No fever or chills   No uncontrolled pain  No vomiting or diarrhea     Objective:    BP (!) 183/86   Pulse 80   Temp 99.1 °F (37.3 °C) (Oral)   Resp 16   Ht 5' 8\" (1.727 m)   Wt 185 lb 10 oz (84.2 kg)   SpO2 96%   BMI 28.22 kg/m²     24HR INTAKE/OUTPUT:      Intake/Output Summary (Last 24 hours) at 11/9/2020 0709  Last data filed at 11/9/2020 0620  Gross per 24 hour   Intake 3198 ml   Output 2600 ml   Net 598 ml     Urine clear  General appearance: NAD, conversant  Neck: FROM, supple   Lungs: Clear bilaterally no wheezes, no rhonchi, no crackles  CV: RRR, no MRGs; normal carotid upstroke and amplitude without Bruits  Abdomen: Soft, non-tender; no masses or HSM  Extremities: No edema, no cyanosis, no clubbing  Skin: Intact no rash, no lesions, no ulcers    Psych: Alert and oriented normal affect  Neuro: Nonfocal  Most Recent Labs  Lab Results   Component Value Date    WBC 14.7 (H) 11/09/2020    HGB 9.6 (L) 11/09/2020    HCT 30.4 (L) 11/09/2020     11/09/2020     11/09/2020    K 3.8 11/09/2020     11/09/2020    CREATININE 0.7 11/09/2020    BUN 12 11/09/2020    CO2 22 11/09/2020    GLUCOSE 145 (H) 11/09/2020    ALT 10 11/05/2020    AST 30 11/05/2020    INR 1.5 11/05/2020     No results for input(s): MG in the last 72 hours. Lab Results   Component Value Date    CALCIUM 8.2 (L) 11/09/2020        No orders to display       Assessment    Principal Problem:    Hematuria, gross  Active Problems:    Acute blood loss anemia    Hypertension    DM (diabetes mellitus), type 2 (HCC)    Chronic atrial fibrillation (HCC)    Hematuria  Resolved Problems:    * No resolved hospital problems.  *      Plan:  80-year-old male history of A. fib on Eliquis and Tambocor, BPH status post recent TURP, diabetes admitted with gross hematuria UTI--SP cystoscopy 11/6     IV antibiotics rocephin-- doesn't cover enterococcus  ID consult for recomendations  BCx NGTD  Urine culture--polymicrobial  Blood culture NGTD  Monitor labs--hemoglobin stable transfusion  Insulins adjusted  Hold AC  Continue Tambocor  Add amlodipine for uncontrolled hypertension  Stop IVF  Discussed w Dr Karin Roque  Medications for other co morbidities cont as appropriate w dosage adjustments as necessary   PT/OT  DVT PPx  DC planning OK for DC form my standpoint. Recommend resume Apixaban when bleeding risk resolved and final abx plan     Thank you for allowing me to participate in the care of this patient.     Electronically signed by Tamie Andersen MD on 11/9/2020 at 7:09 AM

## 2020-11-09 NOTE — CARE COORDINATION
Patient is POD#3 Cystopanendoscopy, clot evacuation, and Mcduffie catheter placement for Clot retention and status post TURP. Per urology note today, Will remove mcduffie in am for TOV. If able to void with out hematuria will discharge tomorrow. In the meantime, ID was consulted for UTI. Await input and plan. I met with pateint in room to explain role, discuss therapy evals and transition of care. He lives alone in a mobile home with ramp step(s) to enter; bed/bath on 1 level. Bathroom setup: walk in shower. Equipment owned: shower chair. Prior Level of Function: intermittent assist with ADLs/IADLs; using cane for functional mobility. Driving: yes. Occupation: retired . I offered home health care and he declined. He said that his daughter lives  nearby in [facility] and can assist him with whatever needs he may have. Patient said that his son will transport him home at time of discharge.   Arely Kuo RN CM

## 2020-11-09 NOTE — CONSULTS
CATHETER PLACEMENT performed by Tatiana Box MD at [address] [facility] IO LENS PROSTH W/O ECP Left 6/5/2018    CATARACT EXTRACTION WITH IOL LEFT EYE performed by Yonny Thompson MD at [address] [facility]       Current Medications:   Scheduled Meds:   amLODIPine  5 mg Oral Daily    cefTRIAXone (ROCEPHIN) IV  1 g Intravenous Q24H    atorvastatin  20 mg Oral Nightly    famotidine  20 mg Oral BID    flecainide  50 mg Oral BID    rOPINIRole  0.25 mg Oral Nightly    sacubitril-valsartan  1 tablet Oral BID    insulin lispro  0-6 Units Subcutaneous TID WC    insulin lispro  0-3 Units Subcutaneous Nightly     Continuous Infusions:   dextrose       PRN Meds:acetaminophen, diphenhydrAMINE, opium-belladonna, oxyCODONE-acetaminophen, glucose, dextrose, glucagon (rDNA), dextrose    Allergies:  Patient has no known allergies.     Social History:   Social History     Socioeconomic History    Marital status:      Spouse name: None    Number of children: None    Years of education: None    Highest education level: None   Occupational History    None   Social Needs    Financial resource strain: None    Food insecurity     Worry: None     Inability: None    Transportation needs     Medical: None     Non-medical: None   Tobacco Use    Smoking status: Former Smoker     Packs/day: 25.00     Types: Cigarettes    Smokeless tobacco: Never Used   Substance and Sexual Activity    Alcohol use: No    Drug use: None    Sexual activity: None   Lifestyle    Physical activity     Days per week: None     Minutes per session: None    Stress: None   Relationships    Social connections     Talks on phone: None     Gets together: None     Attends Mandaen service: None     Active member of club or organization: None     Attends meetings of clubs or organizations: None     Relationship status: None    Intimate partner violence     Fear of current or ex partner: None     Emotionally abused: None     Physically abused: None     Forced sexual activity: None   Other Topics Concern    None   Social History Narrative    None     Tobacco: No  Alcohol: No  Pets: No  Travel: No    Lives alone, retired fork      Family History:   History reviewed. No pertinent family history. . Otherwise non-pertinent to the chief complaint. REVIEW OF SYSTEMS:    CONSTITUTIONAL:  + chills, + fevers, no night sweats. No loss of weight. EYES:  No double vision or drainage from eyes, ears or throat. HEENT:  No neck stiffness. No dysphagia. No drainage from eyes, ears or throat  RESPIRATORY:  No cough, productive sputum or hemoptysis. CARDIOVASCULAR:  No chest pain, palpitations, orthopnea or dyspnea on exertion. Pacemaker left chest.  GASTROINTESTINAL:  No nausea, vomiting, + diarrhea or constipation or hematochezia + abdominal pain on admission has since resolved  GENITOURINARY:  No frequency burning dysuria or + hematuria on admission. Has since resolved  INTEGUMENT/BREAST:  No rash or breast masses. HEMATOLOGIC/LYMPHATIC:  No lymphadenopathy or blood dyscrasics. ALLERGIC/IMMUNOLOGIC:  No anaphylaxis. ENDOCRINE:  No polyuria or polydipsia or temperature intolerance. MUSCULOSKELETAL:  No myalgia or arthralgia. Full ROM. NEUROLOGICAL:  No focal motor sensory deficit. BEHAVIOR/PSYCH:  No psychosis. PHYSICAL EXAM:    Vitals:    BP (!) 146/64   Pulse 72   Temp 98.5 °F (36.9 °C) (Temporal)   Resp 18   Ht 5' 8\" (1.727 m)   Wt 185 lb 10 oz (84.2 kg)   SpO2 94%   BMI 28.22 kg/m²   Constitutional: The patient is awake, alert, and oriented. Lying in bed, NAD. Room air   Skin: Warm and dry. No rashes were noted. No jaundice. HEENT: Eyes show round, and reactive pupils. Moist mucous membranes, no ulcerations, no thrush. Neck: Supple to movements. No lymphadenopathy. Chest: No use of accessory muscles to breathe. Symmetrical expansion.  Auscultation reveals no wheezing, crackles, or rhonchi. Cardiovascular: S1 and S2 are rhythmic and regular. No murmurs appreciated. Pacemaker in left chest  Abdomen: Positive bowel sounds to auscultation. Benign to palpation. No masses felt. No hepatosplenomegaly. Genitourinary: male, mcudffie. Clear yellow urine   Extremities: No clubbing, no cyanosis, no edema. Musculoskeletal: equal and symmertrical  Neurological: no focal  Lines: peripheral      CBC+dif:  Recent Labs     11/07/20  1613  11/09/20  0456   WBC 20.9*  --  14.7*   HGB 6.9*   < > 9.6*   HCT 22.1*   < > 30.4*   MCV 78.9*  --  79.6*     --  429   NEUTROABS 18.55*  --   --     < > = values in this interval not displayed.      No results found for: CRP  No results found for: CRPHS  No results found for: SEDRATE  Lab Results   Component Value Date    ALT 10 11/05/2020    AST 30 11/05/2020    ALKPHOS 124 11/05/2020    BILITOT 0.4 11/05/2020     Lab Results   Component Value Date     11/09/2020    K 3.8 11/09/2020     11/09/2020    CO2 22 11/09/2020    BUN 12 11/09/2020    CREATININE 0.7 11/09/2020    GFRAA >60 11/09/2020    LABGLOM >60 11/09/2020    GLUCOSE 145 11/09/2020    PROT 6.4 11/05/2020    LABALBU 2.7 11/05/2020    CALCIUM 8.2 11/09/2020    BILITOT 0.4 11/05/2020    ALKPHOS 124 11/05/2020    AST 30 11/05/2020    ALT 10 11/05/2020       Lab Results   Component Value Date    PROTIME 16.6 11/05/2020    INR 1.5 11/05/2020       No results found for: TSH    Lab Results   Component Value Date    COLORU FARTUN 11/05/2020    PHUR 6.5 11/05/2020    WBCUA >20 11/05/2020    RBCUA PACKED 11/05/2020    BACTERIA MODERATE 11/05/2020    CLARITYU CLOUDY 11/05/2020    SPECGRAV 1.020 11/05/2020    LEUKOCYTESUR LARGE 11/05/2020    UROBILINOGEN 2.0 11/05/2020    BILIRUBINUR MODERATE 11/05/2020    BLOODU LARGE 11/05/2020    GLUCOSEU 100 11/05/2020       No results found for: YYK4PUO, BEART, F8XLDIPR, PHART, THGBART, RDP8YHH, PO2ART, GXJ9NZA  Radiology:  No orders to display Microbiology:  Pending  Recent Labs     11/07/20  1613   BC 24 Hours no growth     Recent Labs     11/07/20  1831   ORG Gram positive cocci*     No results for input(s): Antonio Remy in the last 72 hours. No results for input(s): STREPNEUMAGU in the last 72 hours. No results for input(s): LP1UAG in the last 72 hours. No results for input(s): ASO in the last 72 hours. No results for input(s): CULTRESP in the last 72 hours. Assessment:  · UTI s/p urologic procedures with multiple reinsertions of mcduffie catheters and manipulation of catheters for irrigation  · Prostate CA s/p TURP  · Diarrhea r/o c diff   · Leukocytosis r/t above as well as acute blood loss- hgb was 6.9 & volume depletion secondary to bleeding & diarrhea. Plan:    · Cont rocephin   · add ampicillin to cover enterococcus   · Check cultures  · c diff culture pending  · Baseline ESR, CRP  · Monitor labs  · Will follow with you  · Plan discussed with Dr. Eugenia Toscano     Thank you for having us see this patient in consultation. I will be discussing this case with the treating physicians. Electronically signed by ZULLY Neal CNP on 11/9/2020 at 3:38 PM     Pt seen and examined. Above discussed agree with advanced practice nurse. Labs, cultures, and radiographs reviewed. Face to Face encounter occurred. Changes made as necessary.      Johanny Rebolledo MD

## 2020-11-09 NOTE — PROGRESS NOTES
notfied [employer] of pt's complaint of multiple episodes of diarrhea, via bureau. Pt went once since I began the shift. Not witnessed. Stated that he went 8 times today.

## 2020-11-09 NOTE — PROGRESS NOTES
Progress Note  Date:2020       Room:8402/8402-B  Patient Name:Jamaal Bennett     YOB: 1940     Age:80 y.o. Subjective    Subjective   Afebrile  Urine clear  Pt feeling better  Has some diarrhea  Hb now 9.6 since transfusions  creainine 1.0  Review of Systems  Objective         Vitals Last 24 Hours:  TEMPERATURE:  Temp  Av.8 °F (37.1 °C)  Min: 98.5 °F (36.9 °C)  Max: 99.1 °F (37.3 °C)  RESPIRATIONS RANGE: Resp  Av.7  Min: 16  Max: 18  PULSE OXIMETRY RANGE: SpO2  Av %  Min: 94 %  Max: 96 %  PULSE RANGE: Pulse  Av.7  Min: 72  Max: 80  BLOOD PRESSURE RANGE: Systolic (09HKM), OGF:894 , Min:146 , JLH:862   ; Diastolic (17LDO), DZK:42, Min:64, Max:86    I/O (24Hr): Intake/Output Summary (Last 24 hours) at 2020 1131  Last data filed at 2020 4014  Gross per 24 hour   Intake 3198 ml   Output 2600 ml   Net 598 ml     Objective  Labs/Imaging/Diagnostics    Labs:  CBC:  Recent Labs     20  1613 20  0723 20  0456   WBC 20.9*  --  14.7*   RBC 2.80*  --  3.82   HGB 6.9* 9.1* 9.6*   HCT 22.1* 28.1* 30.4*   MCV 78.9*  --  79.6*   RDW 16.4*  --  16.2*     --  429     CHEMISTRIES:  Recent Labs     20  1613 20  0456    138   K 3.7 3.8    103   CO2 23 22   BUN 23 12   CREATININE 1.0 0.7   GLUCOSE 192* 145*     PT/INR:No results for input(s): PROTIME, INR in the last 72 hours. APTT:No results for input(s): APTT in the last 72 hours. LIVER PROFILE:No results for input(s): AST, ALT, BILIDIR, BILITOT, ALKPHOS in the last 72 hours. Imaging Last 24 Hours:  No results found.   Assessment//Plan           Hospital Problems           Last Modified POA    * (Principal) Hematuria, gross 2020 Yes    Acute blood loss anemia 2020 Yes    Hypertension 2020 Yes    DM (diabetes mellitus), type 2 ([facility] 2020 Yes    Chronic atrial fibrillation ([facility] 2020 Yes    Hematuria 2020 Yes        Assessment & Plan  Will remove mcduffie in am for TOV   If able to void with out hematuria will discharge tomorrow  Electronically signed by José Miguel Gutiérrez MD on 11/9/20 at 11:31 AM EST

## 2020-11-10 VITALS
HEART RATE: 72 BPM | TEMPERATURE: 98.4 F | DIASTOLIC BLOOD PRESSURE: 58 MMHG | OXYGEN SATURATION: 95 % | BODY MASS INDEX: 28.13 KG/M2 | RESPIRATION RATE: 16 BRPM | SYSTOLIC BLOOD PRESSURE: 148 MMHG | WEIGHT: 185.63 LBS | HEIGHT: 68 IN

## 2020-11-10 LAB
BLOOD CULTURE, ROUTINE: NORMAL
CULTURE, BLOOD 2: NORMAL
METER GLUCOSE: 181 MG/DL (ref 74–99)
METER GLUCOSE: 193 MG/DL (ref 74–99)
METER GLUCOSE: 242 MG/DL (ref 74–99)
ORGANISM: ABNORMAL
URINE CULTURE, ROUTINE: ABNORMAL

## 2020-11-10 PROCEDURE — 6360000002 HC RX W HCPCS: Performed by: UROLOGY

## 2020-11-10 PROCEDURE — 2580000003 HC RX 258: Performed by: UROLOGY

## 2020-11-10 PROCEDURE — 6370000000 HC RX 637 (ALT 250 FOR IP): Performed by: REGISTERED NURSE

## 2020-11-10 PROCEDURE — 87088 URINE BACTERIA CULTURE: CPT

## 2020-11-10 PROCEDURE — 82962 GLUCOSE BLOOD TEST: CPT

## 2020-11-10 PROCEDURE — 6360000002 HC RX W HCPCS: Performed by: REGISTERED NURSE

## 2020-11-10 PROCEDURE — 2580000003 HC RX 258: Performed by: REGISTERED NURSE

## 2020-11-10 PROCEDURE — 6370000000 HC RX 637 (ALT 250 FOR IP): Performed by: PHYSICIAN ASSISTANT

## 2020-11-10 PROCEDURE — 6370000000 HC RX 637 (ALT 250 FOR IP): Performed by: INTERNAL MEDICINE

## 2020-11-10 RX ORDER — AMOXICILLIN 250 MG/1
500 CAPSULE ORAL EVERY 8 HOURS SCHEDULED
Status: DISCONTINUED | OUTPATIENT
Start: 2020-11-10 | End: 2020-11-11 | Stop reason: HOSPADM

## 2020-11-10 RX ORDER — CEFDINIR 300 MG/1
300 CAPSULE ORAL EVERY 12 HOURS SCHEDULED
Qty: 20 CAPSULE | Refills: 0 | Status: ON HOLD | OUTPATIENT
Start: 2020-11-10 | End: 2020-11-24 | Stop reason: HOSPADM

## 2020-11-10 RX ORDER — CEFDINIR 300 MG/1
300 CAPSULE ORAL EVERY 12 HOURS SCHEDULED
Status: DISCONTINUED | OUTPATIENT
Start: 2020-11-10 | End: 2020-11-11 | Stop reason: HOSPADM

## 2020-11-10 RX ORDER — AMOXICILLIN 500 MG/1
500 CAPSULE ORAL EVERY 8 HOURS SCHEDULED
Qty: 30 CAPSULE | Refills: 0 | Status: ON HOLD | OUTPATIENT
Start: 2020-11-10 | End: 2020-11-24 | Stop reason: HOSPADM

## 2020-11-10 RX ADMIN — AMPICILLIN SODIUM 2 G: 2 INJECTION, POWDER, FOR SOLUTION INTRAMUSCULAR; INTRAVENOUS at 09:24

## 2020-11-10 RX ADMIN — INSULIN LISPRO 2 UNITS: 100 INJECTION, SOLUTION INTRAVENOUS; SUBCUTANEOUS at 16:56

## 2020-11-10 RX ADMIN — WATER 1 G: 1 INJECTION INTRAMUSCULAR; INTRAVENOUS; SUBCUTANEOUS at 06:10

## 2020-11-10 RX ADMIN — FLECAINIDE ACETATE 50 MG: 100 TABLET ORAL at 09:23

## 2020-11-10 RX ADMIN — AMPICILLIN SODIUM 2 G: 2 INJECTION, POWDER, FOR SOLUTION INTRAMUSCULAR; INTRAVENOUS at 00:32

## 2020-11-10 RX ADMIN — AMLODIPINE BESYLATE 5 MG: 5 TABLET ORAL at 09:23

## 2020-11-10 RX ADMIN — INSULIN LISPRO 1 UNITS: 100 INJECTION, SOLUTION INTRAVENOUS; SUBCUTANEOUS at 09:28

## 2020-11-10 RX ADMIN — FAMOTIDINE 20 MG: 20 TABLET ORAL at 09:23

## 2020-11-10 RX ADMIN — AMPICILLIN SODIUM 2 G: 2 INJECTION, POWDER, FOR SOLUTION INTRAMUSCULAR; INTRAVENOUS at 04:35

## 2020-11-10 RX ADMIN — AMPICILLIN SODIUM 2 G: 2 INJECTION, POWDER, FOR SOLUTION INTRAMUSCULAR; INTRAVENOUS at 13:19

## 2020-11-10 RX ADMIN — INSULIN LISPRO 1 UNITS: 100 INJECTION, SOLUTION INTRAVENOUS; SUBCUTANEOUS at 13:23

## 2020-11-10 RX ADMIN — AMOXICILLIN 500 MG: 250 CAPSULE ORAL at 16:54

## 2020-11-10 RX ADMIN — SACUBITRIL AND VALSARTAN 1 TABLET: 49; 51 TABLET, FILM COATED ORAL at 09:23

## 2020-11-10 ASSESSMENT — PAIN SCALES - GENERAL: PAINLEVEL_OUTOF10: 0

## 2020-11-10 NOTE — PLAN OF CARE
Problem: Bleeding:  Goal: Will show no signs and symptoms of excessive bleeding  Description: Will show no signs and symptoms of excessive bleeding  Outcome: Met This Shift     Problem: Pain:  Goal: Pain level will decrease  Description: Pain level will decrease  11/10/2020 0311 by Sneha Mata RN  Outcome: Met This Shift  11/9/2020 1426 by Jodie Christensen RN  Outcome: Met This Shift  Goal: Control of acute pain  Description: Control of acute pain  11/10/2020 0311 by Sneha Mata RN  Outcome: Met This Shift  11/9/2020 1426 by Jodie Christensen RN  Outcome: Met This Shift  Goal: Control of chronic pain  Description: Control of chronic pain  11/10/2020 0311 by Sneha Mata RN  Outcome: Met This Shift  11/9/2020 1426 by Jodie Christensen RN  Outcome: Met This Shift     Problem: Skin Integrity:  Goal: Will show no infection signs and symptoms  Description: Will show no infection signs and symptoms  11/10/2020 0311 by Sneha Mata RN  Outcome: Met This Shift  11/9/2020 1426 by Jodie Christensen RN  Outcome: Met This Shift  Goal: Absence of new skin breakdown  Description: Absence of new skin breakdown  11/10/2020 0311 by Sneha Mata RN  Outcome: Met This Shift  11/9/2020 1426 by Jodie Christensen RN  Outcome: Met This Shift

## 2020-11-10 NOTE — PROGRESS NOTES
[facility] PROGRESS NOTE                                                                       11/10/2020        CHIEF UROLOGIC COMPLAINT: Clot retention, UTI     HISTORY OF PRESENT ILLNESS:  Patient sleeping. Does not arouse easily. Champion removed at 0445. PAST FAMILY HISTORY:  History reviewed. No pertinent family history.   PAST SOCIAL HISTORY:    Social History     Socioeconomic History    Marital status:      Spouse name: None    Number of children: None    Years of education: None    Highest education level: None   Occupational History    None   Social Needs    Financial resource strain: None    Food insecurity     Worry: None     Inability: None    Transportation needs     Medical: None     Non-medical: None   Tobacco Use    Smoking status: Former Smoker     Packs/day: 25.00     Types: Cigarettes    Smokeless tobacco: Never Used   Substance and Sexual Activity    Alcohol use: No    Drug use: None    Sexual activity: None   Lifestyle    Physical activity     Days per week: None     Minutes per session: None    Stress: None   Relationships    Social connections     Talks on phone: None     Gets together: None     Attends Hindu service: None     Active member of club or organization: None     Attends meetings of clubs or organizations: None     Relationship status: None    Intimate partner violence     Fear of current or ex partner: None     Emotionally abused: None     Physically abused: None     Forced sexual activity: None   Other Topics Concern    None   Social History Narrative    None       Scheduled Meds:   amLODIPine  5 mg Oral Daily    ampicillin IV  2 g Intravenous Q4H    cefTRIAXone (ROCEPHIN) IV  1 g Intravenous Q24H    atorvastatin  20 mg Oral Nightly    famotidine  20 mg Oral BID    flecainide  50 mg Oral BID    rOPINIRole  0.25 mg Oral Nightly    sacubitril-valsartan  1 tablet Oral BID    insulin lispro  0-6 Units Subcutaneous TID WC    insulin lispro  0-3 Units Subcutaneous Nightly     Continuous Infusions:   dextrose       PRN Meds:.acetaminophen, diphenhydrAMINE, oxyCODONE-acetaminophen, glucose, dextrose, glucagon (rDNA), dextrose    BP (!) 155/70   Pulse 77   Temp 98.8 °F (37.1 °C) (Temporal)   Resp 16   Ht 5' 8\" (1.727 m)   Wt 185 lb 10 oz (84.2 kg)   SpO2 95%   BMI 28.22 kg/m²     Lab Results   Component Value Date    WBC 14.7 (H) 11/09/2020    HGB 9.6 (L) 11/09/2020    HCT 30.4 (L) 11/09/2020    MCV 79.6 (L) 11/09/2020     11/09/2020       Lab Results   Component Value Date    CREATININE 0.7 11/09/2020       No results found for: PSA    Lab Results   Component Value Date    LABURIN (A) 11/07/2020     Growth present, evaluating for:  Gram positive organism      LABURIN  11/07/2020     25,000 CFU/ml  Identification and sensitivity to follow      LABURIN >100,000 CFU/ml 11/05/2020    LABURIN <25,000 CFU/ml 11/05/2020    LABURIN >100,000 CFU/ml 11/05/2020       Lab Results   Component Value Date    BC 24 Hours no growth 11/07/2020       Lab Results   Component Value Date    BLOODCULT2 24 Hours no growth 11/05/2020       PHYSICAL EXAMINATION:  Skin dry, without rashes  Respirations non-labored, intact  Abdomen soft, non-tender, non-distended      ASSESSMENT AND PLAN:  1. S/P TURP with clot retention. Patient was started prematurely on Eliquis. Hg stable now. Appreciate ID input re: UTI associated with mcduffie manipulation. Trial of void today.       Neal Truong M.D.  11/10/2020  6:31 AM

## 2020-11-10 NOTE — PROGRESS NOTES
Subjective:  Feeling better   No CP or SOB  No fever or chills   No uncontrolled pain  No vomiting or diarrhea     Objective:    BP (!) 155/70   Pulse 77   Temp 98.8 °F (37.1 °C) (Temporal)   Resp 16   Ht 5' 8\" (1.727 m)   Wt 185 lb 10 oz (84.2 kg)   SpO2 95%   BMI 28.22 kg/m²     24HR INTAKE/OUTPUT:      Intake/Output Summary (Last 24 hours) at 11/10/2020 0650  Last data filed at 11/10/2020 0445  Gross per 24 hour   Intake 205 ml   Output 1250 ml   Net -1045 ml     Urine clear  General appearance: NAD, conversant  Neck: FROM, supple   Lungs: Clear bilaterally no wheezes, no rhonchi, no crackles  CV: RRR, no MRGs; normal carotid upstroke and amplitude without Bruits  Abdomen: Soft, non-tender; no masses or HSM  Extremities: No edema, no cyanosis, no clubbing  Skin: Intact no rash, no lesions, no ulcers    Psych: Alert and oriented normal affect  Neuro: Nonfocal  Most Recent Labs  Lab Results   Component Value Date    WBC 14.7 (H) 11/09/2020    HGB 9.6 (L) 11/09/2020    HCT 30.4 (L) 11/09/2020     11/09/2020     11/09/2020    K 3.8 11/09/2020     11/09/2020    CREATININE 0.7 11/09/2020    BUN 12 11/09/2020    CO2 22 11/09/2020    GLUCOSE 145 (H) 11/09/2020    ALT 10 11/05/2020    AST 30 11/05/2020    INR 1.5 11/05/2020     No results for input(s): MG in the last 72 hours. Lab Results   Component Value Date    CALCIUM 8.2 (L) 11/09/2020        No orders to display       Assessment    Principal Problem:    Hematuria, gross  Active Problems:    Acute blood loss anemia    Hypertension    DM (diabetes mellitus), type 2 (HCC)    Chronic atrial fibrillation (HCC)    Hematuria  Resolved Problems:    * No resolved hospital problems.  *      Plan:  80-year-old male history of A. fib on Eliquis and Tambocor, BPH status post recent TURP, diabetes admitted with gross hematuria UTI--SP cystoscopy 11/6     IV antibiotics rocephin--ampicillin added to cover enterococcus  ID Consult appreciated   BCx NGTD  Urine culture--polymicrobial--Enterococcus faecalis, E. coli, Klebsiella oxytoca  Repeat urine culture after Rocephin positive for GPC likely Enterococcus  Blood culture NGTD  Monitor labs--hemoglobin stable transfusion  Insulins adjusted  Hold AC  Continue Tambocor  Add amlodipine for uncontrolled hypertension  Stop IVF  Discussed w Dr Joe Reilly  Medications for other co morbidities cont as appropriate w dosage adjustments as necessary   PT/OT  DVT PPx  DC planning Recommend resume Apixaban and complete oral abx per ID recommendations     Thank you for allowing me to participate in the care of this patient.     Electronically signed by Dahiana Ordonez MD on 11/10/2020 at 6:50 AM

## 2020-11-10 NOTE — PLAN OF CARE
Problem: Bleeding:  Goal: Will show no signs and symptoms of excessive bleeding  Description: Will show no signs and symptoms of excessive bleeding  11/10/2020 0311 by Evangelist Beltran RN  Outcome: Met This Shift     Problem: Pain:  Goal: Pain level will decrease  Description: Pain level will decrease  11/10/2020 1051 by Georgia Romero RN  Outcome: Met This Shift  11/10/2020 0311 by Evangelist Beltran RN  Outcome: Met This Shift  Goal: Control of acute pain  Description: Control of acute pain  11/10/2020 1051 by Georgia Romero RN  Outcome: Met This Shift  11/10/2020 0311 by Evangelist Beltran RN  Outcome: Met This Shift  Goal: Control of chronic pain  Description: Control of chronic pain  11/10/2020 1051 by Georgia Romero RN  Outcome: Met This Shift  11/10/2020 0311 by Evangelist Beltran RN  Outcome: Met This Shift     Problem: Skin Integrity:  Goal: Will show no infection signs and symptoms  Description: Will show no infection signs and symptoms  11/10/2020 1051 by Georgia Romero RN  Outcome: Met This Shift  11/10/2020 0311 by Evangelist Beltran RN  Outcome: Met This Shift  Goal: Absence of new skin breakdown  Description: Absence of new skin breakdown  11/10/2020 1051 by Georgia Romero RN  Outcome: Met This Shift  11/10/2020 0311 by Evangelist Beltran RN  Outcome: Met This Shift

## 2020-11-10 NOTE — PROGRESS NOTES
[address] [place] [address] [facility]  Progress Note      Chief Complaint   Patient presents with    Hematuria     since prostate was removed on 10/26. also states pain in bladder. issues with catheter        SUBJECTIVE:  Patient is tolerating medications. No reported adverse drug reactions. No nausea, vomiting, diarrhea. Afebrile. Champion was removed and patient is voiding. Review of systems:  As stated above in the chief complaint, otherwise negative. Medications:  Scheduled Meds:   amLODIPine  5 mg Oral Daily    ampicillin IV  2 g Intravenous Q4H    cefTRIAXone (ROCEPHIN) IV  1 g Intravenous Q24H    atorvastatin  20 mg Oral Nightly    famotidine  20 mg Oral BID    flecainide  50 mg Oral BID    rOPINIRole  0.25 mg Oral Nightly    sacubitril-valsartan  1 tablet Oral BID    insulin lispro  0-6 Units Subcutaneous TID WC    insulin lispro  0-3 Units Subcutaneous Nightly     Continuous Infusions:   dextrose       PRN Meds:acetaminophen, diphenhydrAMINE, oxyCODONE-acetaminophen, glucose, dextrose, glucagon (rDNA), dextrose    OBJECTIVE:  BP (!) 148/58   Pulse 72   Temp 98.4 °F (36.9 °C) (Temporal)   Resp 16   Ht 5' 8\" (1.727 m)   Wt 185 lb 10 oz (84.2 kg)   SpO2 95%   BMI 28.22 kg/m²   Temp  Av.4 °F (36.9 °C)  Min: 98.1 °F (36.7 °C)  Max: 98.8 °F (37.1 °C)  Constitutional: The patient is awake, alert, and oriented. Room air. Skin: Warm and dry. No rashes were noted. HEENT: Round and reactive pupils. Moist mucous membranes. No ulcerations or thrush. Neck: Supple to movements. Chest: No use of accessory muscles to breathe. Symmetrical expansion. No wheezing, crackles or rhonchi. Left chest pacemaker   Cardiovascular: S1 and S2 are rhythmic and regular. No murmurs appreciated. Abdomen: Positive bowel sounds to auscultation. Benign to palpation. No masses felt. No hepatosplenomegaly. Genitourinary: male  Extremities: No clubbing, no cyanosis, no edema.   Lines: peripheral    Laboratory and Tests Review:  Lab Results   Component Value Date    WBC 14.7 (H) 11/09/2020    WBC 20.9 (H) 11/07/2020    WBC 15.0 (H) 11/06/2020    HGB 9.6 (L) 11/09/2020    HCT 30.4 (L) 11/09/2020    MCV 79.6 (L) 11/09/2020     11/09/2020     Lab Results   Component Value Date    NEUTROABS 18.55 (H) 11/07/2020    NEUTROABS 15.69 (H) 11/05/2020     No results found for: CRPHS  Lab Results   Component Value Date    ALT 10 11/05/2020    AST 30 11/05/2020    ALKPHOS 124 11/05/2020    BILITOT 0.4 11/05/2020     Lab Results   Component Value Date     11/09/2020    K 3.8 11/09/2020     11/09/2020    CO2 22 11/09/2020    BUN 12 11/09/2020    CREATININE 0.7 11/09/2020    CREATININE 1.0 11/07/2020    CREATININE 0.8 11/06/2020    GFRAA >60 11/09/2020    LABGLOM >60 11/09/2020    GLUCOSE 145 11/09/2020    PROT 6.4 11/05/2020    LABALBU 2.7 11/05/2020    CALCIUM 8.2 11/09/2020    BILITOT 0.4 11/05/2020    ALKPHOS 124 11/05/2020    AST 30 11/05/2020    ALT 10 11/05/2020     No results found for: CRP  No results found for: [address]  Radiology:  reviewed    Microbiology:   Lab Results   Component Value Date    BC 24 Hours no growth 11/07/2020    BC 5 Days no growth 11/05/2020    ORG Enterococcus faecalis 11/07/2020    ORG Escherichia coli 11/05/2020    ORG Klebsiella oxytoca 11/05/2020    ORG Enterococcus faecalis 11/05/2020     Lab Results   Component Value Date    BLOODCULT2 5 Days no growth 11/05/2020    ORG Enterococcus faecalis 11/07/2020    ORG Escherichia coli 11/05/2020    ORG Klebsiella oxytoca 11/05/2020    ORG Enterococcus faecalis 11/05/2020     No results found for: WNDABS  No results found for: RESPSMEAR  No results found for: MPNEUMO, CLAMYDCU, LABLEGI, AFBCX, FUNGSM, LABFUNG  No results found for: CULTRESP  No results found for: CXCATHTIP  No results found for: BFCS  No results found for: CXSURG  Urine Culture, Routine   Date Value Ref Range Status   11/07/2020 25,000 CFU/ml  Final 11/05/2020 >100,000 CFU/ml  Final   11/05/2020 <25,000 CFU/ml  Final   11/05/2020 >100,000 CFU/ml  Final     No results found for: [facility]    ASSESSMENT:  · UTI s/p urologic procedures with multiple reinsertions of mcduffie catheters and manipulation of catheters for irrigation -- urine cx is growing only enterrococcus at this point; repeat clean catch to r/o contaminate   · Prostate CA s/p TURP  · Diarrhea r/o c diff   · Leukocytosis r/t above as well as acute blood loss- hgb was 6.9 & volume depletion secondary to bleeding & diarrhea. PLAN:  · Stop rocephin & ampicillin  · transition to PO omnicef & amoxicillin for discharge    · Repeat clean catch urine   · c diff culture is pending   · Scripts on chart; med rec done  · Ok for dc from ID POV  · Check final cultures  · Monitor labs    Berenice ANDREA-CNP  11:37 AM  11/10/2020       Pt seen and examined. Above discussed agree with advanced practice nurse. Labs, cultures, and radiographs reviewed. Face to Face encounter occurred. Changes made as necessary.      Paula Norton MD

## 2020-11-10 NOTE — CARE COORDINATION
Patient is POD#4 Cystopanendoscopy, clot evacuation, and Champion catheter placement for Clot retention and status post TURP. Voiding trial today, Champion removed at 0445, DTV between 0420-4266 and patient did void. Per ID note today, Stop rocephin & ampicillin. transition to PO omnicef & amoxicillin for discharge. Yovani Pina Ra Scripts on chart; med rec done. Mague Resendiz for dc from ID POV. RN checking for discharge. Plan is home with no needs and his son will transport him home at time of discharge.   Chris Fowler RN CM

## 2020-11-11 NOTE — PROGRESS NOTES
Physician Progress Note      PATIENT:               Felix Vasquez  CSN #:                  [id number]  :                       1940  ADMIT DATE:       2020 2:53 AM  DISCH DATE:        11/10/2020 9:50 PM  RESPONDING  PROVIDER #:        Laurel Grace MD          QUERY TEXT:    Pt admitted with UTI s/p urologic procedures. Pt noted to have WBC of 19 and    on admission (). On  pt spiked a temp of 101.2. If possible,   please document in the progress notes and discharge summary if you are   evaluating and /or treating any of the following: The medical record reflects the following:  Risk Factors: UTI, +cx  Clinical Indicators: Per ID  consult: Assessment: UTI s/p urologic procedures   with multiple reinsertions of mcduffie catheters and manipulation of catheters   for irrigation. . Diarrhea r/o c diff /..Leukocytosis r/t above  Plan:Cont   rocephin ,,add ampicillin to cover enterococcus . Per PN : Urine   culture--polymicrobial--Enterococcus faecalis, E. coli, Klebsiella   oxytoca. .Repeat urine culture after Rocephin positive for GPC likely   Enterococcus  Treatment: ID consult, IV abx, labs and monitoring    Thank you  Irma Alvarado RN SBN  Clinical Documentation  [phone]  Options provided:  -- Sepsis, present on admission  -- Sepsis, present on admission, now resolved  -- Sepsis, not present on admission,  -- No Sepsis  -- Other - I will add my own diagnosis  -- Disagree - Not applicable / Not valid  -- Disagree - Clinically unable to determine / Unknown  -- Refer to Clinical Documentation Reviewer    PROVIDER RESPONSE TEXT:    This patient has sepsis which was present on admission.     Query created by: Guillermo Fu on 11/10/2020 9:39 AM      Electronically signed by:  Laurel Grace MD 2020 4:16 PM

## 2020-11-12 LAB
BLOOD CULTURE, ROUTINE: NORMAL
URINE CULTURE, ROUTINE: NORMAL

## 2020-11-13 NOTE — DISCHARGE SUMMARY
[address]                  [address] [facility]  [facility]                               DISCHARGE SUMMARY    PATIENT NAME: Lola Valdez                    :        1940  MED REC NO:   [id number]                            ROOM:       8424  ACCOUNT NO:   [de-identified]                           ADMIT DATE: 2020  PROVIDER:     Adele Victoria MD                  DISCHARGE DATE:  11/10/2020      FINAL DISCHARGE DIAGNOSIS:  Hematuria secondary to post prostatectomy  bleeding. HISTORY:  This 25-year-old male who had a TUR of his prostate on  10/26/2020 had 71 gm of benign tissue resected. He was sent home with a  Champion catheter, which was removed. He was voiding and subsequently  began having hematuria, catheter was reinserted. The patient was  discovered to have been inadvertently continued on his Eliquis which had  been recommended to be held. HOSPITAL COURSE:  The patient underwent placement of a Champion catheter  with irrigation. He ultimately on 2020 required cystoscopy, clot  evacuation, and fulguration of his prostatic fossa. The patient also  received blood transfusion and was treated for urinary tract infection. He was discharged without a Champion catheter and will be seen in the  office for followup.         Mynor Reyes MD    D: 2020 13:00:15       T: 2020 13:08:11     [email or web address]  Job#: [id number]     Doc#: [id number]    CC:

## 2020-11-20 ENCOUNTER — APPOINTMENT (OUTPATIENT)
Dept: CT IMAGING | Age: 80
End: 2020-11-20
Payer: MEDICARE

## 2020-11-20 ENCOUNTER — APPOINTMENT (OUTPATIENT)
Dept: GENERAL RADIOLOGY | Age: 80
End: 2020-11-20
Payer: MEDICARE

## 2020-11-20 ENCOUNTER — HOSPITAL ENCOUNTER (OUTPATIENT)
Age: 80
Setting detail: OBSERVATION
Discharge: HOME | End: 2020-11-24
Attending: EMERGENCY MEDICINE | Admitting: INTERNAL MEDICINE
Payer: MEDICARE

## 2020-11-20 PROBLEM — E86.0 DEHYDRATION: Status: ACTIVE | Noted: 2020-11-20

## 2020-11-20 PROBLEM — I51.7 CARDIOMEGALY: Status: ACTIVE | Noted: 2020-11-20

## 2020-11-20 PROBLEM — R55 SYNCOPE, NEAR: Status: ACTIVE | Noted: 2020-11-20

## 2020-11-20 PROBLEM — K21.9 GASTROESOPHAGEAL REFLUX DISEASE WITHOUT ESOPHAGITIS: Status: ACTIVE | Noted: 2020-11-20

## 2020-11-20 PROBLEM — D72.829 LEUKOCYTOSIS: Status: ACTIVE | Noted: 2020-11-20

## 2020-11-20 PROBLEM — E78.5 HLD (HYPERLIPIDEMIA): Status: ACTIVE | Noted: 2020-11-20

## 2020-11-20 PROBLEM — G25.81 RLS (RESTLESS LEGS SYNDROME): Status: ACTIVE | Noted: 2020-11-20

## 2020-11-20 PROBLEM — I50.9 CHF (CONGESTIVE HEART FAILURE) (HCC): Status: ACTIVE | Noted: 2020-11-20

## 2020-11-20 PROBLEM — N39.0 UTI (URINARY TRACT INFECTION): Status: ACTIVE | Noted: 2020-11-20

## 2020-11-20 LAB
ALBUMIN SERPL-MCNC: 3.3 G/DL (ref 3.5–5.2)
ALP BLD-CCNC: 89 U/L (ref 40–129)
ALT SERPL-CCNC: 10 U/L (ref 0–40)
ANION GAP SERPL CALCULATED.3IONS-SCNC: 12 MMOL/L (ref 7–16)
APTT: 35.5 SEC (ref 24.5–35.1)
AST SERPL-CCNC: 12 U/L (ref 0–39)
BACTERIA: ABNORMAL /HPF
BASOPHILS ABSOLUTE: 0.1 E9/L (ref 0–0.2)
BASOPHILS RELATIVE PERCENT: 0.8 % (ref 0–2)
BILIRUB SERPL-MCNC: 0.5 MG/DL (ref 0–1.2)
BILIRUBIN URINE: ABNORMAL
BLOOD, URINE: ABNORMAL
BUN BLDV-MCNC: 33 MG/DL (ref 8–23)
CALCIUM SERPL-MCNC: 9 MG/DL (ref 8.6–10.2)
CHLORIDE BLD-SCNC: 104 MMOL/L (ref 98–107)
CLARITY: ABNORMAL
CO2: 18 MMOL/L (ref 22–29)
COLOR: ABNORMAL
CREAT SERPL-MCNC: 1.3 MG/DL (ref 0.7–1.2)
EKG ATRIAL RATE: 81 BPM
EKG P AXIS: 28 DEGREES
EKG P-R INTERVAL: 132 MS
EKG Q-T INTERVAL: 476 MS
EKG QRS DURATION: 182 MS
EKG QTC CALCULATION (BAZETT): 552 MS
EKG R AXIS: -172 DEGREES
EKG T AXIS: 16 DEGREES
EKG VENTRICULAR RATE: 81 BPM
EOSINOPHILS ABSOLUTE: 0.37 E9/L (ref 0.05–0.5)
EOSINOPHILS RELATIVE PERCENT: 3.1 % (ref 0–6)
GFR AFRICAN AMERICAN: >60
GFR NON-AFRICAN AMERICAN: 53 ML/MIN/1.73
GLUCOSE BLD-MCNC: 253 MG/DL (ref 74–99)
GLUCOSE URINE: NEGATIVE MG/DL
HCT VFR BLD CALC: 33.9 % (ref 37–54)
HEMOGLOBIN: 10.5 G/DL (ref 12.5–16.5)
IMMATURE GRANULOCYTES #: 0.06 E9/L
IMMATURE GRANULOCYTES %: 0.5 % (ref 0–5)
INR BLD: 1.7
KETONES, URINE: NEGATIVE MG/DL
LACTIC ACID: 1.2 MMOL/L (ref 0.5–2.2)
LACTIC ACID: 3.1 MMOL/L (ref 0.5–2.2)
LEUKOCYTE ESTERASE, URINE: ABNORMAL
LYMPHOCYTES ABSOLUTE: 1.47 E9/L (ref 1.5–4)
LYMPHOCYTES RELATIVE PERCENT: 12.2 % (ref 20–42)
MCH RBC QN AUTO: 25.4 PG (ref 26–35)
MCHC RBC AUTO-ENTMCNC: 31 % (ref 32–34.5)
MCV RBC AUTO: 82.1 FL (ref 80–99.9)
METER GLUCOSE: 146 MG/DL (ref 74–99)
METER GLUCOSE: 235 MG/DL (ref 74–99)
MONOCYTES ABSOLUTE: 0.61 E9/L (ref 0.1–0.95)
MONOCYTES RELATIVE PERCENT: 5.1 % (ref 2–12)
NEUTROPHILS ABSOLUTE: 9.41 E9/L (ref 1.8–7.3)
NEUTROPHILS RELATIVE PERCENT: 78.3 % (ref 43–80)
NITRITE, URINE: NEGATIVE
PDW BLD-RTO: 16.8 FL (ref 11.5–15)
PH UA: 5 (ref 5–9)
PLATELET # BLD: 382 E9/L (ref 130–450)
PMV BLD AUTO: 12.4 FL (ref 7–12)
POTASSIUM REFLEX MAGNESIUM: 4.7 MMOL/L (ref 3.5–5)
PROTEIN UA: 100 MG/DL
PROTHROMBIN TIME: 18.5 SEC (ref 9.3–12.4)
RBC # BLD: 4.13 E12/L (ref 3.8–5.8)
RBC UA: >20 /HPF (ref 0–2)
SODIUM BLD-SCNC: 134 MMOL/L (ref 132–146)
SPECIFIC GRAVITY UA: 1.02 (ref 1–1.03)
TOTAL PROTEIN: 7.4 G/DL (ref 6.4–8.3)
TROPONIN: 0.03 NG/ML (ref 0–0.03)
UROBILINOGEN, URINE: 0.2 E.U./DL
WBC # BLD: 12 E9/L (ref 4.5–11.5)
WBC UA: >20 /HPF (ref 0–5)

## 2020-11-20 PROCEDURE — 85610 PROTHROMBIN TIME: CPT

## 2020-11-20 PROCEDURE — 83605 ASSAY OF LACTIC ACID: CPT

## 2020-11-20 PROCEDURE — 82962 GLUCOSE BLOOD TEST: CPT

## 2020-11-20 PROCEDURE — 96374 THER/PROPH/DIAG INJ IV PUSH: CPT

## 2020-11-20 PROCEDURE — 36415 COLL VENOUS BLD VENIPUNCTURE: CPT

## 2020-11-20 PROCEDURE — 84484 ASSAY OF TROPONIN QUANT: CPT

## 2020-11-20 PROCEDURE — 85730 THROMBOPLASTIN TIME PARTIAL: CPT

## 2020-11-20 PROCEDURE — 99284 EMERGENCY DEPT VISIT MOD MDM: CPT

## 2020-11-20 PROCEDURE — G0378 HOSPITAL OBSERVATION PER HR: HCPCS

## 2020-11-20 PROCEDURE — 96361 HYDRATE IV INFUSION ADD-ON: CPT

## 2020-11-20 PROCEDURE — 85025 COMPLETE CBC W/AUTO DIFF WBC: CPT

## 2020-11-20 PROCEDURE — 2580000003 HC RX 258: Performed by: EMERGENCY MEDICINE

## 2020-11-20 PROCEDURE — 6370000000 HC RX 637 (ALT 250 FOR IP): Performed by: NURSE PRACTITIONER

## 2020-11-20 PROCEDURE — 81001 URINALYSIS AUTO W/SCOPE: CPT

## 2020-11-20 PROCEDURE — 93010 ELECTROCARDIOGRAM REPORT: CPT | Performed by: INTERNAL MEDICINE

## 2020-11-20 PROCEDURE — 70450 CT HEAD/BRAIN W/O DYE: CPT

## 2020-11-20 PROCEDURE — 80053 COMPREHEN METABOLIC PANEL: CPT

## 2020-11-20 PROCEDURE — 6360000002 HC RX W HCPCS: Performed by: EMERGENCY MEDICINE

## 2020-11-20 PROCEDURE — 71045 X-RAY EXAM CHEST 1 VIEW: CPT

## 2020-11-20 PROCEDURE — 93005 ELECTROCARDIOGRAM TRACING: CPT | Performed by: EMERGENCY MEDICINE

## 2020-11-20 PROCEDURE — 2580000003 HC RX 258: Performed by: NURSE PRACTITIONER

## 2020-11-20 PROCEDURE — P9612 CATHETERIZE FOR URINE SPEC: HCPCS

## 2020-11-20 RX ORDER — ATORVASTATIN CALCIUM 20 MG/1
20 TABLET, FILM COATED ORAL NIGHTLY
Status: DISCONTINUED | OUTPATIENT
Start: 2020-11-21 | End: 2020-11-24 | Stop reason: HOSPADM

## 2020-11-20 RX ORDER — FLECAINIDE ACETATE 100 MG/1
50 TABLET ORAL 2 TIMES DAILY
Status: DISCONTINUED | OUTPATIENT
Start: 2020-11-20 | End: 2020-11-24 | Stop reason: HOSPADM

## 2020-11-20 RX ORDER — FERROUS SULFATE 325(65) MG
325 TABLET ORAL 2 TIMES DAILY WITH MEALS
Status: DISCONTINUED | OUTPATIENT
Start: 2020-11-21 | End: 2020-11-24 | Stop reason: HOSPADM

## 2020-11-20 RX ORDER — ROPINIROLE 0.25 MG/1
0.25 TABLET, FILM COATED ORAL NIGHTLY
Status: DISCONTINUED | OUTPATIENT
Start: 2020-11-20 | End: 2020-11-24 | Stop reason: HOSPADM

## 2020-11-20 RX ORDER — SODIUM CHLORIDE 0.9 % (FLUSH) 0.9 %
10 SYRINGE (ML) INJECTION PRN
Status: DISCONTINUED | OUTPATIENT
Start: 2020-11-20 | End: 2020-11-24 | Stop reason: HOSPADM

## 2020-11-20 RX ORDER — DEXTROSE MONOHYDRATE 50 MG/ML
100 INJECTION, SOLUTION INTRAVENOUS PRN
Status: DISCONTINUED | OUTPATIENT
Start: 2020-11-20 | End: 2020-11-24 | Stop reason: HOSPADM

## 2020-11-20 RX ORDER — DEXTROSE MONOHYDRATE 25 G/50ML
12.5 INJECTION, SOLUTION INTRAVENOUS PRN
Status: DISCONTINUED | OUTPATIENT
Start: 2020-11-20 | End: 2020-11-24 | Stop reason: HOSPADM

## 2020-11-20 RX ORDER — SODIUM CHLORIDE 9 MG/ML
INJECTION, SOLUTION INTRAVENOUS CONTINUOUS
Status: DISCONTINUED | OUTPATIENT
Start: 2020-11-20 | End: 2020-11-23

## 2020-11-20 RX ORDER — INSULIN GLARGINE 100 [IU]/ML
48 INJECTION, SOLUTION SUBCUTANEOUS NIGHTLY
Status: DISCONTINUED | OUTPATIENT
Start: 2020-11-20 | End: 2020-11-24 | Stop reason: HOSPADM

## 2020-11-20 RX ORDER — ONDANSETRON 2 MG/ML
4 INJECTION INTRAMUSCULAR; INTRAVENOUS EVERY 6 HOURS PRN
Status: DISCONTINUED | OUTPATIENT
Start: 2020-11-20 | End: 2020-11-20

## 2020-11-20 RX ORDER — ACETAMINOPHEN 650 MG/1
650 SUPPOSITORY RECTAL EVERY 6 HOURS PRN
Status: DISCONTINUED | OUTPATIENT
Start: 2020-11-20 | End: 2020-11-24 | Stop reason: HOSPADM

## 2020-11-20 RX ORDER — SODIUM CHLORIDE 0.9 % (FLUSH) 0.9 %
10 SYRINGE (ML) INJECTION EVERY 12 HOURS SCHEDULED
Status: DISCONTINUED | OUTPATIENT
Start: 2020-11-20 | End: 2020-11-24 | Stop reason: HOSPADM

## 2020-11-20 RX ORDER — LANOLIN ALCOHOL/MO/W.PET/CERES
1000 CREAM (GRAM) TOPICAL DAILY
Status: DISCONTINUED | OUTPATIENT
Start: 2020-11-21 | End: 2020-11-24 | Stop reason: HOSPADM

## 2020-11-20 RX ORDER — FAMOTIDINE 20 MG/1
20 TABLET, FILM COATED ORAL 2 TIMES DAILY
Status: DISCONTINUED | OUTPATIENT
Start: 2020-11-20 | End: 2020-11-24 | Stop reason: HOSPADM

## 2020-11-20 RX ORDER — NICOTINE POLACRILEX 4 MG
15 LOZENGE BUCCAL PRN
Status: DISCONTINUED | OUTPATIENT
Start: 2020-11-20 | End: 2020-11-24 | Stop reason: HOSPADM

## 2020-11-20 RX ORDER — ACETAMINOPHEN 325 MG/1
650 TABLET ORAL EVERY 6 HOURS PRN
Status: DISCONTINUED | OUTPATIENT
Start: 2020-11-20 | End: 2020-11-24 | Stop reason: HOSPADM

## 2020-11-20 RX ORDER — SODIUM CHLORIDE 9 MG/ML
1000 INJECTION, SOLUTION INTRAVENOUS CONTINUOUS
Status: DISCONTINUED | OUTPATIENT
Start: 2020-11-20 | End: 2020-11-20

## 2020-11-20 RX ORDER — POLYETHYLENE GLYCOL 3350 17 G/17G
17 POWDER, FOR SOLUTION ORAL DAILY PRN
Status: DISCONTINUED | OUTPATIENT
Start: 2020-11-20 | End: 2020-11-24 | Stop reason: HOSPADM

## 2020-11-20 RX ORDER — PROMETHAZINE HYDROCHLORIDE 25 MG/1
12.5 TABLET ORAL EVERY 6 HOURS PRN
Status: DISCONTINUED | OUTPATIENT
Start: 2020-11-20 | End: 2020-11-20

## 2020-11-20 RX ORDER — 0.9 % SODIUM CHLORIDE 0.9 %
1000 INTRAVENOUS SOLUTION INTRAVENOUS ONCE
Status: COMPLETED | OUTPATIENT
Start: 2020-11-20 | End: 2020-11-20

## 2020-11-20 RX ADMIN — SACUBITRIL AND VALSARTAN 1 TABLET: 49; 51 TABLET, FILM COATED ORAL at 23:05

## 2020-11-20 RX ADMIN — ROPINIROLE 0.25 MG: 0.25 TABLET, FILM COATED ORAL at 23:05

## 2020-11-20 RX ADMIN — CEFTRIAXONE SODIUM 1 G: 1 INJECTION, POWDER, FOR SOLUTION INTRAMUSCULAR; INTRAVENOUS at 18:20

## 2020-11-20 RX ADMIN — SODIUM CHLORIDE: 9 INJECTION, SOLUTION INTRAVENOUS at 23:05

## 2020-11-20 RX ADMIN — FLECAINIDE ACETATE 50 MG: 100 TABLET ORAL at 23:05

## 2020-11-20 RX ADMIN — FAMOTIDINE 20 MG: 20 TABLET ORAL at 23:05

## 2020-11-20 RX ADMIN — INSULIN LISPRO 1 UNITS: 100 INJECTION, SOLUTION INTRAVENOUS; SUBCUTANEOUS at 23:05

## 2020-11-20 RX ADMIN — INSULIN GLARGINE 48 UNITS: 100 INJECTION, SOLUTION SUBCUTANEOUS at 23:05

## 2020-11-20 RX ADMIN — SODIUM CHLORIDE 1000 ML: 9 INJECTION, SOLUTION INTRAVENOUS at 18:20

## 2020-11-20 RX ADMIN — SODIUM CHLORIDE 1000 ML: 9 INJECTION, SOLUTION INTRAVENOUS at 15:34

## 2020-11-20 ASSESSMENT — PAIN SCALES - GENERAL
PAINLEVEL_OUTOF10: 0
PAINLEVEL_OUTOF10: 0

## 2020-11-20 NOTE — ED NOTES
Patient transport form signed at this time by patient, and placed into the chart.      Pam Chaudhari RN  11/20/20 5369

## 2020-11-20 NOTE — ED NOTES
Nurse currently has mask and gloves on while providing patient care. Patient currently has mask on. Blood collected and sent to the lab.      Kg Aparicio RN  11/20/20 4708

## 2020-11-20 NOTE — ED PROVIDER NOTES
HPI:  11/20/20,   Time: 2:17 PM SHANEKA Lovell is a [de-identified] y.o. male presenting to the ED for sofie syncope, beginning 30 min ago. The complaint has been intermittent, moderate in severity, and worsened by nothing. States hx same, no known cause. States felt week at urologist office. Now feels back to nml. Recent surgery. No cp/sob/n/v/d. Just felt weak and lightheaded    Review of Systems:   Pertinent positives and negatives are stated within HPI, all other systems reviewed and are negative.          --------------------------------------------- PAST HISTORY ---------------------------------------------  Past Medical History:  has a past medical history of CAD (coronary artery disease), Cancer ([facility] Diabetes mellitus ([facility] GERD (gastroesophageal reflux disease), Hyperlipidemia, Hypertension, and Myocardial infarction ([facility] Past Surgical History:  has a past surgical history that includes skin biopsy; Cataract removal with implant (Right, 01/04/2018); Cataract removal (Left, 06/05/2018); pr xcapsl ctrc rmvl insj io lens prosth w/o ecp (Left, 6/5/2018); and Cystoscopy (N/A, 11/6/2020). Social History:  reports that he has quit smoking. His smoking use included cigarettes. He smoked 25.00 packs per day. He has never used smokeless tobacco. He reports that he does not drink alcohol. Family History: family history is not on file. The patients home medications have been reviewed. Allergies: Patient has no known allergies.         ---------------------------------------------------PHYSICAL EXAM--------------------------------------    Constitutional/General: Alert and oriented x3, well appearing, non toxic in NAD  Head: Normocephalic and atraumatic  Eyes: PERRL, EOMI, conjunctive normal, sclera non icteric  Mouth: Oropharynx clear, handling secretions, no trismus, no asymmetry of the posterior oropharynx or uvular edema  Neck: Supple, full ROM, non tender to palpation in the midline, no stridor, no crepitus, no meningeal signs  Respiratory: Lungs clear to auscultation bilaterally, no wheezes, rales, or rhonchi. Not in respiratory distress  Cardiovascular:  Regular rate. Regular rhythm. No murmurs, gallops, or rubs. 2+ distal pulses  Chest: No chest wall tenderness, pacer in left chest  GI:  Abdomen Soft, Non tender, Non distended. +BS. No organomegaly, no palpable masses,  No rebound, guarding, or rigidity. Musculoskeletal: Moves all extremities x 4. Warm and well perfused, no clubbing, cyanosis, or edema. Capillary refill <3 seconds  Integument: skin warm and dry. No rashes. Lymphatic: no lymphadenopathy noted  Neurologic: GCS 15, no focal deficits, symmetric strength 5/5 in the upper and lower extremities bilaterally  Psychiatric: Normal Affect    -------------------------------------------------- RESULTS -------------------------------------------------  I have personally reviewed all laboratory and imaging results for this patient. Results are listed below.      LABS:  Results for orders placed or performed during the hospital encounter of 11/20/20   CBC Auto Differential   Result Value Ref Range    WBC 12.0 (H) 4.5 - 11.5 E9/L    RBC 4.13 3.80 - 5.80 E12/L    Hemoglobin 10.5 (L) 12.5 - 16.5 g/dL    Hematocrit 33.9 (L) 37.0 - 54.0 %    MCV 82.1 80.0 - 99.9 fL    MCH 25.4 (L) 26.0 - 35.0 pg    MCHC 31.0 (L) 32.0 - 34.5 %    RDW 16.8 (H) 11.5 - 15.0 fL    Platelets 733 503 - 703 E9/L    MPV 12.4 (H) 7.0 - 12.0 fL    Neutrophils % 78.3 43.0 - 80.0 %    Immature Granulocytes % 0.5 0.0 - 5.0 %    Lymphocytes % 12.2 (L) 20.0 - 42.0 %    Monocytes % 5.1 2.0 - 12.0 %    Eosinophils % 3.1 0.0 - 6.0 %    Basophils % 0.8 0.0 - 2.0 %    Neutrophils Absolute 9.41 (H) 1.80 - 7.30 E9/L    Immature Granulocytes # 0.06 E9/L    Lymphocytes Absolute 1.47 (L) 1.50 - 4.00 E9/L    Monocytes Absolute 0.61 0.10 - 0.95 E9/L    Eosinophils Absolute 0.37 0.05 - 0.50 E9/L    Basophils Absolute 0.10 0.00 - 0.20 E9/L Comprehensive Metabolic Panel w/ Reflex to MG   Result Value Ref Range    Sodium 134 132 - 146 mmol/L    Potassium reflex Magnesium 4.7 3.5 - 5.0 mmol/L    Chloride 104 98 - 107 mmol/L    CO2 18 (L) 22 - 29 mmol/L    Anion Gap 12 7 - 16 mmol/L    Glucose 253 (H) 74 - 99 mg/dL    BUN 33 (H) 8 - 23 mg/dL    CREATININE 1.3 (H) 0.7 - 1.2 mg/dL    GFR Non-African American 53 >=60 mL/min/1.73    GFR African American >60     Calcium 9.0 8.6 - 10.2 mg/dL    Total Protein 7.4 6.4 - 8.3 g/dL    Alb 3.3 (L) 3.5 - 5.2 g/dL    Total Bilirubin 0.5 0.0 - 1.2 mg/dL    Alkaline Phosphatase 89 40 - 129 U/L    ALT 10 0 - 40 U/L    AST 12 0 - 39 U/L   Troponin   Result Value Ref Range    Troponin 0.03 0.00 - 0.03 ng/mL   Protime-INR   Result Value Ref Range    Protime 18.5 (H) 9.3 - 12.4 sec    INR 1.7    APTT   Result Value Ref Range    aPTT 35.5 (H) 24.5 - 35.1 sec   Urinalysis, reflex to microscopic   Result Value Ref Range    Color, UA OTHER (A) Straw/Yellow    Clarity, UA CLOUDY (A) Clear    Glucose, Ur Negative Negative mg/dL    Bilirubin Urine SMALL (A) Negative    Ketones, Urine Negative Negative mg/dL    Specific Gravity, UA 1.025 1.005 - 1.030    Blood, Urine LARGE (A) Negative    pH, UA 5.0 5.0 - 9.0    Protein,  (A) Negative mg/dL    Urobilinogen, Urine 0.2 <2.0 E.U./dL    Nitrite, Urine Negative Negative    Leukocyte Esterase, Urine MODERATE (A) Negative   Lactic Acid, Plasma   Result Value Ref Range    Lactic Acid 3.1 (H) 0.5 - 2.2 mmol/L   Microscopic Urinalysis   Result Value Ref Range    WBC, UA >20 (A) 0 - 5 /HPF    RBC, UA >20 0 - 2 /HPF    Bacteria, UA MODERATE (A) None Seen /HPF   POCT Glucose   Result Value Ref Range    Meter Glucose 235 (H) 74 - 99 mg/dL   EKG 12 Lead   Result Value Ref Range    Ventricular Rate 81 BPM    Atrial Rate 81 BPM    P-R Interval 132 ms    QRS Duration 182 ms    Q-T Interval 476 ms    QTc Calculation (Bazett) 552 ms    P Axis 28 degrees    R Axis -172 degrees    T Axis 16 degrees       RADIOLOGY:  Interpreted by Radiologist.  CT Head WO Contrast   Final Result   No acute intracranial abnormality. XR CHEST PORTABLE   Final Result   1. Cardiomegaly. No findings of failure or gross pneumonia. EKG: This EKG is signed and interpreted by the EP. Time: 1357  Rate: 80  Rhythm: paced  Interpretation: paced rhythm  Comparison: None      ------------------------- NURSING NOTES AND VITALS REVIEWED ---------------------------   The nursing notes within the ED encounter and vital signs as below have been reviewed by myself. BP (!) 144/73   Pulse 81   Temp 97.6 °F (36.4 °C) (Oral)   Resp 18   Wt 185 lb (83.9 kg)   SpO2 100%   BMI 28.13 kg/m²   Oxygen Saturation Interpretation: Normal    The patients available past medical records and past encounters were reviewed. ------------------------------ ED COURSE/MEDICAL DECISION MAKING----------------------  Medications   0.9 % sodium chloride infusion (1,000 mLs Intravenous New Bag 11/20/20 1820)   0.9 % sodium chloride bolus (0 mLs Intravenous Stopped 11/20/20 1636)   cefTRIAXone (ROCEPHIN) 1 g in sterile water 10 mL IV syringe (1 g Intravenous New Bag 11/20/20 1820)         ED COURSE:       Medical Decision Making:    Sx improved in ed with ivf, w/u noted with bump in lactic, will admit, pos ua, abx ordered. Due to cardiac hx and bp stable in ed, pt only give 1 l ns. Pt agreeable with poc      This patient's ED course included: a personal history and physicial examination    This patient has remained hemodynamically stable during their ED course. Re-Evaluations:             Re-evaluation.   Patients symptoms are improving    Re-examination  11/20/20   2:17 PM EST          Vital Signs:   Vitals:    11/20/20 1358 11/20/20 1511 11/20/20 1622 11/20/20 1752   BP: (!) 105/48 (!) 111/56 (!) 110/54 (!) 144/73   Pulse: 78 74 85 81   Resp: 16 16 16 18   Temp: 97.6 °F (36.4 °C)      TempSrc: Oral      SpO2: 99% 99% 100% 100%   Weight: 185 lb (83.9 kg)        Card/Pulm:  Rhythm: normal rate. Heart Sounds: no murmurs, gallops, or rubs. clear to auscultation, no wheezes or rales and unlabored breathing. Capillary Refill: normal.  Radial Pulse:  equal.  Skin:  Warm. Consultations:             [facility]    Critical Care:         Counseling: The emergency provider has spoken with the patient and discussed todays results, in addition to providing specific details for the plan of care and counseling regarding the diagnosis and prognosis. Questions are answered at this time and they are agreeable with the plan.       --------------------------------- IMPRESSION AND DISPOSITION ---------------------------------    IMPRESSION  1. Dehydration    2. Acute cystitis with hematuria    3. Syncope and collapse        DISPOSITION  Disposition: Admit to telemetry  Patient condition is stable    NOTE: This report was transcribed using voice recognition software.  Every effort was made to ensure accuracy; however, inadvertent computerized transcription errors may be present        Lorraine Eisenmenger, MD  11/20/20 8576

## 2020-11-20 NOTE — ED NOTES
Patient resting comfortably. No current complaints/problems stated or noted at this time.       Kg Aparicio RN  11/20/20 4900

## 2020-11-21 PROBLEM — N17.9 AKI (ACUTE KIDNEY INJURY) (HCC): Status: ACTIVE | Noted: 2020-11-21

## 2020-11-21 LAB
ANION GAP SERPL CALCULATED.3IONS-SCNC: 11 MMOL/L (ref 7–16)
BUN BLDV-MCNC: 25 MG/DL (ref 8–23)
CALCIUM SERPL-MCNC: 8.7 MG/DL (ref 8.6–10.2)
CHLORIDE BLD-SCNC: 105 MMOL/L (ref 98–107)
CO2: 22 MMOL/L (ref 22–29)
CREAT SERPL-MCNC: 0.9 MG/DL (ref 0.7–1.2)
GFR AFRICAN AMERICAN: >60
GFR NON-AFRICAN AMERICAN: >60 ML/MIN/1.73
GLUCOSE BLD-MCNC: 121 MG/DL (ref 74–99)
HCT VFR BLD CALC: 32.5 % (ref 37–54)
HEMOGLOBIN: 10 G/DL (ref 12.5–16.5)
LACTIC ACID: 1.1 MMOL/L (ref 0.5–2.2)
LACTIC ACID: 1.5 MMOL/L (ref 0.5–2.2)
LACTIC ACID: 1.9 MMOL/L (ref 0.5–2.2)
LACTIC ACID: 2 MMOL/L (ref 0.5–2.2)
LACTIC ACID: 2 MMOL/L (ref 0.5–2.2)
LV EF: 43 %
LVEF MODALITY: NORMAL
MCH RBC QN AUTO: 24.9 PG (ref 26–35)
MCHC RBC AUTO-ENTMCNC: 30.8 % (ref 32–34.5)
MCV RBC AUTO: 80.8 FL (ref 80–99.9)
METER GLUCOSE: 132 MG/DL (ref 74–99)
METER GLUCOSE: 169 MG/DL (ref 74–99)
METER GLUCOSE: 218 MG/DL (ref 74–99)
METER GLUCOSE: 228 MG/DL (ref 74–99)
PDW BLD-RTO: 17.1 FL (ref 11.5–15)
PLATELET # BLD: 313 E9/L (ref 130–450)
PMV BLD AUTO: 12.8 FL (ref 7–12)
POTASSIUM REFLEX MAGNESIUM: 4.4 MMOL/L (ref 3.5–5)
RBC # BLD: 4.02 E12/L (ref 3.8–5.8)
SODIUM BLD-SCNC: 138 MMOL/L (ref 132–146)
TROPONIN: <0.01 NG/ML (ref 0–0.03)
TROPONIN: <0.01 NG/ML (ref 0–0.03)
WBC # BLD: 9.1 E9/L (ref 4.5–11.5)

## 2020-11-21 PROCEDURE — 85027 COMPLETE CBC AUTOMATED: CPT

## 2020-11-21 PROCEDURE — G0378 HOSPITAL OBSERVATION PER HR: HCPCS

## 2020-11-21 PROCEDURE — 82962 GLUCOSE BLOOD TEST: CPT

## 2020-11-21 PROCEDURE — 96376 TX/PRO/DX INJ SAME DRUG ADON: CPT

## 2020-11-21 PROCEDURE — 36415 COLL VENOUS BLD VENIPUNCTURE: CPT

## 2020-11-21 PROCEDURE — 83605 ASSAY OF LACTIC ACID: CPT

## 2020-11-21 PROCEDURE — 6370000000 HC RX 637 (ALT 250 FOR IP): Performed by: PHYSICIAN ASSISTANT

## 2020-11-21 PROCEDURE — 80048 BASIC METABOLIC PNL TOTAL CA: CPT

## 2020-11-21 PROCEDURE — 84484 ASSAY OF TROPONIN QUANT: CPT

## 2020-11-21 PROCEDURE — 51798 US URINE CAPACITY MEASURE: CPT

## 2020-11-21 PROCEDURE — 6370000000 HC RX 637 (ALT 250 FOR IP): Performed by: NURSE PRACTITIONER

## 2020-11-21 PROCEDURE — 96372 THER/PROPH/DIAG INJ SC/IM: CPT

## 2020-11-21 PROCEDURE — 2580000003 HC RX 258: Performed by: NURSE PRACTITIONER

## 2020-11-21 PROCEDURE — 93306 TTE W/DOPPLER COMPLETE: CPT

## 2020-11-21 PROCEDURE — 6360000002 HC RX W HCPCS: Performed by: NURSE PRACTITIONER

## 2020-11-21 RX ORDER — PETROLATUM 42 G/100G
OINTMENT TOPICAL 2 TIMES DAILY PRN
Status: DISCONTINUED | OUTPATIENT
Start: 2020-11-21 | End: 2020-11-24 | Stop reason: HOSPADM

## 2020-11-21 RX ADMIN — FLECAINIDE ACETATE 50 MG: 100 TABLET ORAL at 20:21

## 2020-11-21 RX ADMIN — SODIUM CHLORIDE, PRESERVATIVE FREE 10 ML: 5 INJECTION INTRAVENOUS at 20:21

## 2020-11-21 RX ADMIN — Medication 1000 MCG: at 09:34

## 2020-11-21 RX ADMIN — ENOXAPARIN SODIUM 40 MG: 40 INJECTION SUBCUTANEOUS at 09:35

## 2020-11-21 RX ADMIN — FAMOTIDINE 20 MG: 20 TABLET ORAL at 20:21

## 2020-11-21 RX ADMIN — ROPINIROLE 0.25 MG: 0.25 TABLET, FILM COATED ORAL at 20:21

## 2020-11-21 RX ADMIN — INSULIN LISPRO 1 UNITS: 100 INJECTION, SOLUTION INTRAVENOUS; SUBCUTANEOUS at 20:21

## 2020-11-21 RX ADMIN — SACUBITRIL AND VALSARTAN 1 TABLET: 49; 51 TABLET, FILM COATED ORAL at 20:21

## 2020-11-21 RX ADMIN — INSULIN LISPRO 1 UNITS: 100 INJECTION, SOLUTION INTRAVENOUS; SUBCUTANEOUS at 12:00

## 2020-11-21 RX ADMIN — WATER 1 G: 1 INJECTION INTRAMUSCULAR; INTRAVENOUS; SUBCUTANEOUS at 16:20

## 2020-11-21 RX ADMIN — INSULIN GLARGINE 48 UNITS: 100 INJECTION, SOLUTION SUBCUTANEOUS at 20:21

## 2020-11-21 RX ADMIN — FERROUS SULFATE TAB 325 MG (65 MG ELEMENTAL FE) 325 MG: 325 (65 FE) TAB at 16:20

## 2020-11-21 RX ADMIN — SACUBITRIL AND VALSARTAN 1 TABLET: 49; 51 TABLET, FILM COATED ORAL at 09:34

## 2020-11-21 RX ADMIN — SODIUM CHLORIDE, PRESERVATIVE FREE 10 ML: 5 INJECTION INTRAVENOUS at 09:34

## 2020-11-21 RX ADMIN — INSULIN LISPRO 2 UNITS: 100 INJECTION, SOLUTION INTRAVENOUS; SUBCUTANEOUS at 16:25

## 2020-11-21 RX ADMIN — FLECAINIDE ACETATE 50 MG: 100 TABLET ORAL at 09:35

## 2020-11-21 RX ADMIN — FERROUS SULFATE TAB 325 MG (65 MG ELEMENTAL FE) 325 MG: 325 (65 FE) TAB at 09:35

## 2020-11-21 RX ADMIN — SODIUM CHLORIDE: 9 INJECTION, SOLUTION INTRAVENOUS at 13:45

## 2020-11-21 RX ADMIN — FAMOTIDINE 20 MG: 20 TABLET ORAL at 09:35

## 2020-11-21 RX ADMIN — ATORVASTATIN CALCIUM 20 MG: 20 TABLET, FILM COATED ORAL at 20:21

## 2020-11-21 ASSESSMENT — PAIN SCALES - GENERAL
PAINLEVEL_OUTOF10: 0
PAINLEVEL_OUTOF10: 0

## 2020-11-21 NOTE — PLAN OF CARE
Problem: Falls - Risk of:  Goal: Will remain free from falls  Description: Will remain free from falls  11/21/2020 1421 by Margaret Juan RN  Outcome: Met This Shift  11/21/2020 0403 by Jessica Marcial RN  Outcome: Met This Shift  Goal: Absence of physical injury  Description: Absence of physical injury  11/21/2020 1421 by Margaret Juan RN  Outcome: Met This Shift  11/21/2020 0403 by Jessica Marcial RN  Outcome: Met This Shift

## 2020-11-21 NOTE — H&P
Sol Neves M.D. History and Physical      CHIEF COMPLAINT:  Near syncope    Reason for Admission:  Near syncope, UTI, FABIOLA    History Obtained From:  patient, electronic medical record    HISTORY OF PRESENT ILLNESS:      The patient is a [de-identified] y.o. male of [facility] with significant past medical history of DM, HLD, HTN who presents with a near syncopal episode. He states that he was walking yesterday when he felt like he might pass out. He denies actual loss of consciousness. He did not experience any chest pain or cardiac symptoms with this event. He admits to having a recent left-sided pacemaker placed approximately 2 months ago-I do not see notes on the chart in regards to pacemaker placement he indicates compliance with his medications. He has not experienced another similar episode since that time. He notices hematuria, but denies fever, chills, chest pain, SOB, abdominal pain, vomiting, diarrhea or swelling in his legs. He is currently tolerating lunch well.  Pleasant and cooperative on exam.   BP (!) 144/68   Pulse 80   Temp 98.1 °F (36.7 °C) (Temporal)   Resp 19   Wt 185 lb (83.9 kg)   SpO2 97%   BMI 28.13 kg/m²       Past Medical History:        Diagnosis Date    CAD (coronary artery disease)     Cancer ([facility] [address] [facility]     skin     Diabetes mellitus ([facility] [facility]     GERD (gastroesophageal reflux disease)     Hyperlipidemia     Hypertension     Myocardial infarction ([facility] [address] [facility] 1999     Past Surgical History:        Procedure Laterality Date    CATARACT REMOVAL Left 06/05/2018    Cataract Extraction with IOL Left Eye    CATARACT REMOVAL WITH IMPLANT Right 01/04/2018    CYSTOSCOPY N/A 11/6/2020    CYSTOSCOPY, CLOT EVACUATION, CHATTERJEE CATHETER PLACEMENT performed by Michelle Choudhary MD at [address] [facility] INSJ IO LENS PROSTH W/O ECP Left 6/5/2018    CATARACT EXTRACTION WITH IOL LEFT EYE performed by Heather Escudero MD at [address] [facility] BI[facility]           Medications Prior to Admission:    Medications Prior to Admission: [] cefdinir (OMNICEF) 300 MG capsule, Take 1 capsule by mouth every 12 hours for 10 days  [] amoxicillin (AMOXIL) 500 MG capsule, Take 1 capsule by mouth every 8 hours for 10 days  flecainide (TAMBOCOR) 50 MG tablet, Take 50 mg by mouth 2 times daily Do not know mg  ferrous sulfate (FE TABS 325) 325 (65 Fe) MG EC tablet, Take 325 mg by mouth 2 times daily (with meals)  sacubitril-valsartan (ENTRESTO) 49-51 MG per tablet, Take 1 tablet by mouth 2 times daily  famotidine (PEPCID) 20 MG tablet, Take 20 mg by mouth 2 times daily  atorvastatin (LIPITOR) 20 MG tablet, Take 20 mg by mouth daily  rOPINIRole (REQUIP) 0.25 MG tablet, Take 0.25 mg by mouth nightly  Insulin Glargine (TOUJEO MAX SOLOSTAR SC), Inject 60 Units into the skin nightly   vitamin B-12 (CYANOCOBALAMIN) 1000 MCG tablet, Take 1,000 mcg by mouth daily  lisinopril (PRINIVIL;ZESTRIL) 10 MG tablet, Take 5 mg by mouth daily     Allergies:  Patient has no known allergies. Social History:   TOBACCO:   reports that he has quit smoking. His smoking use included cigarettes. He smoked 25.00 packs per day. He has never used smokeless tobacco.  ETOH:   reports no history of alcohol use. MARITAL STATUS:    OCCUPATION:  [address], now retired     Family History:   History reviewed. No pertinent family history. REVIEW OF SYSTEMS:    General ROS: negative for - chills or fever  Hematological and Lymphatic ROS: negative  Endocrine ROS: negative  Respiratory ROS: no  shortness of breath   Cardiovascular ROS: no chest pain .  + near syncope  Gastrointestinal ROS: no abdominal pain, change in bowel habits, or black or bloody stools  Genito-Urinary ROS: + hematuria  Neurological ROS: no TIA or stroke symptoms  negative    Vitals:  BP (!) 144/68   Pulse 80   Temp 98.1 °F (36.7 °C) (Temporal)   Resp 19   Wt 185 lb (83.9 kg)   SpO2 97%   BMI 28.13 kg/m² PHYSICAL EXAM:  General:  Awake, alert, oriented X 3. Well developed, well nourished, well groomed. No apparent distress. HEENT:  Normocephalic, atraumatic. EOMI bilaterally. No scleral icterus. No conjunctival injection. Normal lips, teeth, and gums. No nasal discharge. Neck:  Supple, normal ROM  Heart: Left-sided pacemaker in place RRR, no murmurs, gallops, rubs   Lungs:  CTA bilaterally, bilat symmetrical expansion, no wheeze, rales, or rhonchi  Abdomen: Bowel sounds present, soft, nontender, no masses   Extremities:  No clubbing, cyanosis, or edema  Skin:  Warm and dry. Large circular, macular rash noted to the right upper back with central clearing and faint, slightly raised erythematous borders. Neuro:  Cranial nerves 2-12 grossly intact, no focal deficits  Breast: deferred  Rectal: deferred  Genitalia:  deferred      DATA:     Recent Labs     11/20/20  1420 11/21/20  0612   WBC 12.0* 9.1   HGB 10.5* 10.0*    313     Recent Labs     11/20/20  1420 11/21/20  0612    138   K 4.7 4.4   BUN 33* 25*   CREATININE 1.3* 0.9     Recent Labs     11/20/20  1420   PROT 7.4   INR 1.7     Recent Labs     11/20/20  1420   AST 12   ALT 10   ALKPHOS 89   BILITOT 0.5     No results for input(s): BNP in the last 72 hours. Recent Labs     11/20/20  1420   TROPONINI 0.03       ASSESSMENT:      Principal Problem:    Syncope, near  Active Problems:    Hypertension    Type 2 diabetes mellitus with hyperglycemia, with long-term current use of insulin (HCC)    Chronic atrial fibrillation (HCC)    Gastroesophageal reflux disease without esophagitis    HLD (hyperlipidemia)    RLS (restless legs syndrome)    UTI (urinary tract infection)    Dehydration    Leukocytosis    CHF (congestive heart failure) ([address]    Cardiomegaly  Resolved Problems:    * No resolved hospital problems.  *        PLAN:    Admit to telemetry for near syncope  According to patient, recent pacemaker placed approximately 2 months ago  Orthostatic vital signs q shift  Trend troponin  IV fluids given with improvement of his symptoms  Echo pending    FABIOLA with urinary incontinence-patient is status post recent TURP by urology  IV fluids  Improved   Consult urology to continue to follow and for incontinence and surgery    UTI  Afebrile, monitor blood work  Rocephin  Patient stable    DM  Sliding scale  Monitor blood glucose level    HTN/HLD  Continue home meds with parameters for anti-hypertensives.      DVT prophylaxis  PT/OT  Discharge planning      Sharyn Aparicio, [address]  11/21/2020  12:18 PM     Above reviewed in conjunction with DAVE Salazar - I agree  Pt Seen and Examined, questions answered by me   Martir David made to HPI/PE/as deemed neccessary by me  Plan formulated in conjunction with me after discussion     Lili Chang MD

## 2020-11-21 NOTE — PROGRESS NOTES
Patient  constantly incontinent/ leaking urine every time while moving or turing. Bladder scan after post void residual:34ml.

## 2020-11-22 LAB
ANION GAP SERPL CALCULATED.3IONS-SCNC: 14 MMOL/L (ref 7–16)
BASOPHILS ABSOLUTE: 0.09 E9/L (ref 0–0.2)
BASOPHILS RELATIVE PERCENT: 1.1 % (ref 0–2)
BUN BLDV-MCNC: 20 MG/DL (ref 8–23)
CALCIUM SERPL-MCNC: 8.9 MG/DL (ref 8.6–10.2)
CHLORIDE BLD-SCNC: 104 MMOL/L (ref 98–107)
CO2: 19 MMOL/L (ref 22–29)
CREAT SERPL-MCNC: 0.9 MG/DL (ref 0.7–1.2)
EOSINOPHILS ABSOLUTE: 0.61 E9/L (ref 0.05–0.5)
EOSINOPHILS RELATIVE PERCENT: 7.5 % (ref 0–6)
GFR AFRICAN AMERICAN: >60
GFR NON-AFRICAN AMERICAN: >60 ML/MIN/1.73
GLUCOSE BLD-MCNC: 106 MG/DL (ref 74–99)
HCT VFR BLD CALC: 33.5 % (ref 37–54)
HEMOGLOBIN: 10.4 G/DL (ref 12.5–16.5)
IMMATURE GRANULOCYTES #: 0.03 E9/L
IMMATURE GRANULOCYTES %: 0.4 % (ref 0–5)
LYMPHOCYTES ABSOLUTE: 1.42 E9/L (ref 1.5–4)
LYMPHOCYTES RELATIVE PERCENT: 17.5 % (ref 20–42)
MCH RBC QN AUTO: 25.2 PG (ref 26–35)
MCHC RBC AUTO-ENTMCNC: 31 % (ref 32–34.5)
MCV RBC AUTO: 81.1 FL (ref 80–99.9)
METER GLUCOSE: 122 MG/DL (ref 74–99)
METER GLUCOSE: 151 MG/DL (ref 74–99)
METER GLUCOSE: 164 MG/DL (ref 74–99)
METER GLUCOSE: 168 MG/DL (ref 74–99)
MONOCYTES ABSOLUTE: 0.57 E9/L (ref 0.1–0.95)
MONOCYTES RELATIVE PERCENT: 7 % (ref 2–12)
NEUTROPHILS ABSOLUTE: 5.38 E9/L (ref 1.8–7.3)
NEUTROPHILS RELATIVE PERCENT: 66.5 % (ref 43–80)
PDW BLD-RTO: 17.2 FL (ref 11.5–15)
PLATELET # BLD: 276 E9/L (ref 130–450)
PMV BLD AUTO: 12.5 FL (ref 7–12)
POTASSIUM SERPL-SCNC: 4 MMOL/L (ref 3.5–5)
RBC # BLD: 4.13 E12/L (ref 3.8–5.8)
SODIUM BLD-SCNC: 137 MMOL/L (ref 132–146)
WBC # BLD: 8.1 E9/L (ref 4.5–11.5)

## 2020-11-22 PROCEDURE — 82962 GLUCOSE BLOOD TEST: CPT

## 2020-11-22 PROCEDURE — G0378 HOSPITAL OBSERVATION PER HR: HCPCS

## 2020-11-22 PROCEDURE — 6370000000 HC RX 637 (ALT 250 FOR IP): Performed by: UROLOGY

## 2020-11-22 PROCEDURE — 51702 INSERT TEMP BLADDER CATH: CPT

## 2020-11-22 PROCEDURE — 80048 BASIC METABOLIC PNL TOTAL CA: CPT

## 2020-11-22 PROCEDURE — 96372 THER/PROPH/DIAG INJ SC/IM: CPT

## 2020-11-22 PROCEDURE — 6370000000 HC RX 637 (ALT 250 FOR IP): Performed by: PHYSICIAN ASSISTANT

## 2020-11-22 PROCEDURE — 2580000003 HC RX 258: Performed by: NURSE PRACTITIONER

## 2020-11-22 PROCEDURE — 96376 TX/PRO/DX INJ SAME DRUG ADON: CPT

## 2020-11-22 PROCEDURE — 36415 COLL VENOUS BLD VENIPUNCTURE: CPT

## 2020-11-22 PROCEDURE — 6360000002 HC RX W HCPCS: Performed by: NURSE PRACTITIONER

## 2020-11-22 PROCEDURE — 6370000000 HC RX 637 (ALT 250 FOR IP): Performed by: NURSE PRACTITIONER

## 2020-11-22 PROCEDURE — 85025 COMPLETE CBC W/AUTO DIFF WBC: CPT

## 2020-11-22 RX ORDER — CLOTRIMAZOLE AND BETAMETHASONE DIPROPIONATE 10; .5 MG/ML; MG/ML
LOTION TOPICAL 2 TIMES DAILY
Status: DISCONTINUED | OUTPATIENT
Start: 2020-11-22 | End: 2020-11-24 | Stop reason: HOSPADM

## 2020-11-22 RX ADMIN — WATER 1 G: 1 INJECTION INTRAMUSCULAR; INTRAVENOUS; SUBCUTANEOUS at 18:04

## 2020-11-22 RX ADMIN — SODIUM CHLORIDE: 9 INJECTION, SOLUTION INTRAVENOUS at 09:16

## 2020-11-22 RX ADMIN — ROPINIROLE 0.25 MG: 0.25 TABLET, FILM COATED ORAL at 21:25

## 2020-11-22 RX ADMIN — CLOTRIMAZOLE AND BETAMETHASONE DIPROPIONATE: 10; .5 LOTION TOPICAL at 22:34

## 2020-11-22 RX ADMIN — SACUBITRIL AND VALSARTAN 1 TABLET: 49; 51 TABLET, FILM COATED ORAL at 09:16

## 2020-11-22 RX ADMIN — FAMOTIDINE 20 MG: 20 TABLET ORAL at 21:25

## 2020-11-22 RX ADMIN — ENOXAPARIN SODIUM 40 MG: 40 INJECTION SUBCUTANEOUS at 09:16

## 2020-11-22 RX ADMIN — FAMOTIDINE 20 MG: 20 TABLET ORAL at 09:16

## 2020-11-22 RX ADMIN — Medication 1000 MCG: at 09:15

## 2020-11-22 RX ADMIN — INSULIN GLARGINE 48 UNITS: 100 INJECTION, SOLUTION SUBCUTANEOUS at 21:25

## 2020-11-22 RX ADMIN — INSULIN LISPRO 1 UNITS: 100 INJECTION, SOLUTION INTRAVENOUS; SUBCUTANEOUS at 11:43

## 2020-11-22 RX ADMIN — INSULIN LISPRO 1 UNITS: 100 INJECTION, SOLUTION INTRAVENOUS; SUBCUTANEOUS at 17:18

## 2020-11-22 RX ADMIN — SACUBITRIL AND VALSARTAN 1 TABLET: 49; 51 TABLET, FILM COATED ORAL at 21:25

## 2020-11-22 RX ADMIN — ATORVASTATIN CALCIUM 20 MG: 20 TABLET, FILM COATED ORAL at 21:25

## 2020-11-22 RX ADMIN — FERROUS SULFATE TAB 325 MG (65 MG ELEMENTAL FE) 325 MG: 325 (65 FE) TAB at 17:18

## 2020-11-22 RX ADMIN — CLOTRIMAZOLE AND BETAMETHASONE DIPROPIONATE: 10; .5 LOTION TOPICAL at 16:52

## 2020-11-22 RX ADMIN — INSULIN LISPRO 1 UNITS: 100 INJECTION, SOLUTION INTRAVENOUS; SUBCUTANEOUS at 21:43

## 2020-11-22 RX ADMIN — FLECAINIDE ACETATE 50 MG: 100 TABLET ORAL at 21:25

## 2020-11-22 RX ADMIN — FLECAINIDE ACETATE 50 MG: 100 TABLET ORAL at 09:15

## 2020-11-22 RX ADMIN — FERROUS SULFATE TAB 325 MG (65 MG ELEMENTAL FE) 325 MG: 325 (65 FE) TAB at 09:15

## 2020-11-22 ASSESSMENT — PAIN SCALES - GENERAL
PAINLEVEL_OUTOF10: 0

## 2020-11-22 NOTE — PROGRESS NOTES
Subjective: The patient is awake and alert but fatigued. No problems overnight. Denies chest pain and dyspnea. Denies abdominal pain. Tolerating diet. No nausea or vomiting. He is frustrated as he claims he has not been seen by any physicians  I saw him yesterday and talked to him at length length  Telemetry reviewed-no acute findings     objective:    BP (!) 143/67   Pulse 78   Temp 97.3 °F (36.3 °C) (Oral)   Resp 18   Wt 179 lb 12.8 oz (81.6 kg)   SpO2 98%   BMI 27.34 kg/m²     In: 240 [P.O.:240]  Out: 34     HEENT: NCAT,  Pupils equal  Heart:  RRR, no murmurs, gallops, or rubs.   Lungs:  CTA bilaterally, no wheeze, rales or rhonchi  Abd: bowel sounds present, nontender, nondistended, no masses  Extrem:  No clubbing, cyanosis, or edema     Recent Labs     11/20/20  1420 11/21/20  0612 11/22/20  0556   WBC 12.0* 9.1 8.1   HGB 10.5* 10.0* 10.4*   HCT 33.9* 32.5* 33.5*    313 276       Recent Labs     11/20/20  1420 11/21/20  0612 11/22/20  0556    138 137   K 4.7 4.4 4.0    105 104   CO2 18* 22 19*   BUN 33* 25* 20   CREATININE 1.3* 0.9 0.9   CALCIUM 9.0 8.7 8.9       Assessment:    Patient Active Problem List   Diagnosis    Right cataract    Left eye complaint    Hematuria, gross    Acute blood loss anemia    Hypertension    Type 2 diabetes mellitus with hyperglycemia, with long-term current use of insulin ([facility]    Chronic atrial fibrillation ([facility]    Hematuria    Syncope, near    Gastroesophageal reflux disease without esophagitis    HLD (hyperlipidemia)    RLS (restless legs syndrome)    UTI (urinary tract infection)    Dehydration    Leukocytosis    CHF (congestive heart failure) ([facility]    Cardiomegaly    FABIOLA (acute kidney injury) ([facility] [address]       Plan:    Admit to telemetry for near syncope  According to patient, recent pacemaker placed approximately 2 months ago  Orthostatic vital signs q shift  Troponin remained negative  IV fluids given with improvement of his

## 2020-11-22 NOTE — PLAN OF CARE
Problem: Falls - Risk of:  Goal: Will remain free from falls  Description: Will remain free from falls  11/22/2020 0309 by Samm Palencia RN  Outcome: Ongoing  11/21/2020 1421 by Sina Eastman RN  Outcome: Met This Shift  Goal: Absence of physical injury  Description: Absence of physical injury  11/22/2020 0309 by Samm Palencia RN  Outcome: Ongoing  11/21/2020 1421 by Sina Eastman RN  Outcome: Met This Shift

## 2020-11-22 NOTE — CONSULTS
(ENTRESTO) 49-51 MG per tablet, Take 1 tablet by mouth 2 times daily  famotidine (PEPCID) 20 MG tablet, Take 20 mg by mouth 2 times daily  atorvastatin (LIPITOR) 20 MG tablet, Take 20 mg by mouth daily  rOPINIRole (REQUIP) 0.25 MG tablet, Take 0.25 mg by mouth nightly  Insulin Glargine (TOUJEO MAX SOLOSTAR SC), Inject 60 Units into the skin nightly   vitamin B-12 (CYANOCOBALAMIN) 1000 MCG tablet, Take 1,000 mcg by mouth daily  lisinopril (PRINIVIL;ZESTRIL) 10 MG tablet, Take 5 mg by mouth daily     Allergies:    Patient has no known allergies. Social History:    reports that he has quit smoking. His smoking use included cigarettes. He smoked 25.00 packs per day. He has never used smokeless tobacco. He reports that he does not drink alcohol. Family History:   Non-contributory to this Urological problem  family history is not on file. REVIEW OF SYSTEMS:  Generalized weakness  Respiratory: negative for cough and hemoptysis  Cardiovascular: negative for chest pain and dyspnea  Gastrointestinal: negative for abdominal pain, diarrhea, nausea and vomiting  Derm: negative for rash and skin lesion(s)  Neurological: negative for seizures and tremors  Endocrine: negative for diabetic symptoms including polydipsia and polyuria  : As above in the HPI, otherwise negative  All other systems negative    PHYSICAL EXAM:    Vitals:  BP (!) 143/67   Pulse 78   Temp 97.3 °F (36.3 °C) (Oral)   Resp 18   Wt 179 lb 12.8 oz (81.6 kg)   SpO2 98%   BMI 27.34 kg/m²     General:  Awake, alert, oriented X 3. Well developed, well nourished, well groomed. No apparent distress. HEENT:  Normocephalic, atraumatic. Pupils equal, round. No scleral icterus. No conjunctival injection. Normal lips, teeth, and gums. No nasal discharge. Neck:  Supple, no masses. Heart:  RRR  Lungs:  No audible wheezing. Respirations symmetric and non-labored.   Abdomen:  soft, nontender, no masses, no organomegaly, no peritoneal

## 2020-11-23 LAB
ANION GAP SERPL CALCULATED.3IONS-SCNC: 12 MMOL/L (ref 7–16)
BUN BLDV-MCNC: 18 MG/DL (ref 8–23)
CALCIUM SERPL-MCNC: 8.9 MG/DL (ref 8.6–10.2)
CHLORIDE BLD-SCNC: 104 MMOL/L (ref 98–107)
CO2: 20 MMOL/L (ref 22–29)
CREAT SERPL-MCNC: 0.9 MG/DL (ref 0.7–1.2)
GFR AFRICAN AMERICAN: >60
GFR NON-AFRICAN AMERICAN: >60 ML/MIN/1.73
GLUCOSE BLD-MCNC: 111 MG/DL (ref 74–99)
MAGNESIUM: 1.8 MG/DL (ref 1.6–2.6)
METER GLUCOSE: 124 MG/DL (ref 74–99)
METER GLUCOSE: 158 MG/DL (ref 74–99)
METER GLUCOSE: 158 MG/DL (ref 74–99)
METER GLUCOSE: 171 MG/DL (ref 74–99)
POTASSIUM SERPL-SCNC: 3.8 MMOL/L (ref 3.5–5)
SODIUM BLD-SCNC: 136 MMOL/L (ref 132–146)

## 2020-11-23 PROCEDURE — 80048 BASIC METABOLIC PNL TOTAL CA: CPT

## 2020-11-23 PROCEDURE — 96372 THER/PROPH/DIAG INJ SC/IM: CPT

## 2020-11-23 PROCEDURE — 6360000002 HC RX W HCPCS: Performed by: NURSE PRACTITIONER

## 2020-11-23 PROCEDURE — 97530 THERAPEUTIC ACTIVITIES: CPT | Performed by: PHYSICAL THERAPIST

## 2020-11-23 PROCEDURE — 6370000000 HC RX 637 (ALT 250 FOR IP): Performed by: NURSE PRACTITIONER

## 2020-11-23 PROCEDURE — 2580000003 HC RX 258: Performed by: NURSE PRACTITIONER

## 2020-11-23 PROCEDURE — 97161 PT EVAL LOW COMPLEX 20 MIN: CPT | Performed by: PHYSICAL THERAPIST

## 2020-11-23 PROCEDURE — 97165 OT EVAL LOW COMPLEX 30 MIN: CPT

## 2020-11-23 PROCEDURE — 6370000000 HC RX 637 (ALT 250 FOR IP): Performed by: PHYSICIAN ASSISTANT

## 2020-11-23 PROCEDURE — 36415 COLL VENOUS BLD VENIPUNCTURE: CPT

## 2020-11-23 PROCEDURE — G0378 HOSPITAL OBSERVATION PER HR: HCPCS

## 2020-11-23 PROCEDURE — 96376 TX/PRO/DX INJ SAME DRUG ADON: CPT

## 2020-11-23 PROCEDURE — 6370000000 HC RX 637 (ALT 250 FOR IP): Performed by: INTERNAL MEDICINE

## 2020-11-23 PROCEDURE — 83735 ASSAY OF MAGNESIUM: CPT

## 2020-11-23 PROCEDURE — 82962 GLUCOSE BLOOD TEST: CPT

## 2020-11-23 RX ADMIN — INSULIN LISPRO 1 UNITS: 100 INJECTION, SOLUTION INTRAVENOUS; SUBCUTANEOUS at 21:30

## 2020-11-23 RX ADMIN — CLOTRIMAZOLE AND BETAMETHASONE DIPROPIONATE: 10; .5 LOTION TOPICAL at 08:47

## 2020-11-23 RX ADMIN — ROPINIROLE 0.25 MG: 0.25 TABLET, FILM COATED ORAL at 20:45

## 2020-11-23 RX ADMIN — CLOTRIMAZOLE AND BETAMETHASONE DIPROPIONATE: 10; .5 LOTION TOPICAL at 22:04

## 2020-11-23 RX ADMIN — FLECAINIDE ACETATE 50 MG: 100 TABLET ORAL at 08:41

## 2020-11-23 RX ADMIN — SACUBITRIL AND VALSARTAN 0.5 TABLET: 49; 51 TABLET, FILM COATED ORAL at 20:45

## 2020-11-23 RX ADMIN — FAMOTIDINE 20 MG: 20 TABLET ORAL at 21:30

## 2020-11-23 RX ADMIN — ENOXAPARIN SODIUM 40 MG: 40 INJECTION SUBCUTANEOUS at 08:42

## 2020-11-23 RX ADMIN — FERROUS SULFATE TAB 325 MG (65 MG ELEMENTAL FE) 325 MG: 325 (65 FE) TAB at 16:46

## 2020-11-23 RX ADMIN — SACUBITRIL AND VALSARTAN 1 TABLET: 49; 51 TABLET, FILM COATED ORAL at 08:41

## 2020-11-23 RX ADMIN — ATORVASTATIN CALCIUM 20 MG: 20 TABLET, FILM COATED ORAL at 20:45

## 2020-11-23 RX ADMIN — SODIUM CHLORIDE, PRESERVATIVE FREE 10 ML: 5 INJECTION INTRAVENOUS at 20:44

## 2020-11-23 RX ADMIN — FLECAINIDE ACETATE 50 MG: 100 TABLET ORAL at 20:44

## 2020-11-23 RX ADMIN — FAMOTIDINE 20 MG: 20 TABLET ORAL at 08:42

## 2020-11-23 RX ADMIN — INSULIN GLARGINE 48 UNITS: 100 INJECTION, SOLUTION SUBCUTANEOUS at 20:45

## 2020-11-23 RX ADMIN — INSULIN LISPRO 1 UNITS: 100 INJECTION, SOLUTION INTRAVENOUS; SUBCUTANEOUS at 12:08

## 2020-11-23 RX ADMIN — FERROUS SULFATE TAB 325 MG (65 MG ELEMENTAL FE) 325 MG: 325 (65 FE) TAB at 08:42

## 2020-11-23 RX ADMIN — INSULIN LISPRO 1 UNITS: 100 INJECTION, SOLUTION INTRAVENOUS; SUBCUTANEOUS at 16:46

## 2020-11-23 RX ADMIN — Medication 1000 MCG: at 08:41

## 2020-11-23 RX ADMIN — SODIUM CHLORIDE, PRESERVATIVE FREE 10 ML: 5 INJECTION INTRAVENOUS at 08:42

## 2020-11-23 ASSESSMENT — PAIN SCALES - GENERAL
PAINLEVEL_OUTOF10: 0

## 2020-11-23 NOTE — PROGRESS NOTES
tract infection)    Dehydration    Leukocytosis    CHF (congestive heart failure) ([facility])    Cardiomegaly    FABIOLA (acute kidney injury) ([facility] [address]  Resolved Problems:    * No resolved hospital problems. *      Plan:    Admit to telemetry for near syncope  Patient reports continued lightheadedness today. He denies any dizziness or vertiginous symptoms. States is exacerbated by standing or sitting up. Decrease Entresto dose today  Orthostatic vital signs q shift--negative repeat today  Troponin negative  IV fluids given with improvement of his symptoms-Echo reviewed-ejection fraction 40 to 45% with moderate LVH   --recommend follow-up with cardiology as an outpatient-no immediate need for such     FABIOLA with urinary incontinence-patient is status post recent TURP by urology  FABIOLA resolved  IV fluids completed  Consult urology to continue to follow and for incontinence and surgery-Consult appreciated   UTI  Afebrile, monitor blood work  Rocephin-discontinue after today's dose        DM  Sliding scale  Monitor blood glucose level  Adequately controlled      HTN/HLD  Continue home meds with parameters for anti-hypertensives.    As needed IV hydralazine     Tinea cruris--Lotrimin ordered      Electronically signed by Dat Duran MD on 11/23/2020 at 6:08 AM

## 2020-11-23 NOTE — PROGRESS NOTES
Occupational Therapy  OT consult received. Chart reviewed. Will hold evaluation secondary to patient declined due to fatigue with nursing reporting patient just returned to bed from chair. Will re-attempt OT evaluation as schedule permits when patient is appropriate.  Chris Barton, OTR/L #[id number]

## 2020-11-23 NOTE — PROGRESS NOTES
Physical Therapy    Physical Therapy Initial Assessment     Name: Linda Beavers  : 1940  MRN: [id number]    Referring Provider:  ZULLY Paredes CNP    Date of Service: 2020    Evaluating PT:  [place] Keila PT, DPT [id number]      Room #:  2669/6234-Y  Diagnosis:  Syncope, near  PMHx/PSHx:  Skin CA, MI, CAD, diabetes, HLD, HTN, GERD, cataract removal  Procedure/Surgery:  None this admission  Precautions:  Falls, dizziness  Equipment Needs:  Pt owns necessary DME    SUBJECTIVE:    Pt lives alone in a single story mobile home with ramp to enter and B rails. Bed is on first floor and bath is on first floor. Pt ambulated with no AD prior to admission. 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 His son lives locally and assists PRN. OBJECTIVE:   Initial Evaluation  Date: 20 Treatment Short Term/ Long Term   Goals   AM-PAC 6 Clicks 75/32     Was pt agreeable to Eval/treatment? yes     Does pt have pain? No c/o pain     Bed Mobility  Rolling: SBA  Supine to sit: SBA  Sit to supine: NT  Scooting: SBA  Rolling: Mod Independent   Supine to sit: Mod Independent   Sit to supine: Mod Independent   Scooting: Mod Independent    Transfers Sit to stand: SBA  Stand to sit: SBA  Stand pivot: SBA with FWW  Sit to stand: Mod Independent   Stand to sit: Mod Independent   Stand pivot: Mod Independent    Ambulation    75 feet with FWW with SBA  >150 feet with AAD with Mod Independent    Stair negotiation: ascended and descended  NT  NA, pt does not have stairs to negotiate his residence. 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 Pt completed bed mobility with use of bed rails without external assist required. He reported dizziness that gradually subsided while seated EOB following supine>sit. No c/o dizziness reported with sit>stand. Pt educated in allowing symptoms to subside prior to progressing with additional mobility for improved safety and decreased risk of falls in the home. He requested use of FWW for all ambulation due to feeling unsteady. Pt cued for widened NEO and upright posture ans he demonstrates narrow steps with mild external rotation on R LE throughout. Pt completed 90 and 180 degree turns without LOB. Pt seated in chair upon completion of session with all needs in reach. Reviewed with pt progression from [address] to cane as he feels appropriate in the home and safety awareness with each device. Pt will benefit from short course of skilled PT services to improve high level balance and overall independence with mobility. Treatment:  Patient practiced and was instructed in the following treatment:     Bed mobility: cues for monitoring of symptoms and activity pacing.  Transfer training: cues for B UE placement and alignment with surfaces prior to sit<>stand, cues for safe use of walker during transitions   Gait training: cues for upright posture and widened NEO, intermittent cues for improved walker approximation, education for progression of AD at home    Pt's/ family goals   1. To return home independently    Patient and or family understand(s) diagnosis, prognosis, and plan of care.   yes    PLAN OF CARE:    Current Treatment Recommendations   [] Strengthening     [] ROM   [x] Balance Training   [x] Endurance Training   [x] Transfer Training   [x] Gait Training   [] Stair Training   [] Positioning   [x] Safety and Education Training   [x] Patient/Caregiver Education   [] HEP  [] Other       PT care will be provided in accordance with the objectives noted above. Whenever appropriate, clear delegation orders will be provided for nursing staff. Exercises and functional mobility practice will be used as well as appropriate assistive devices or modalities to obtain goals. Patient and family education will also be administered as needed. Frequency of treatments: 2-5x/week x 3-5 days. Time in  0839  Time out  0958    Total Treatment Time  8 minutes     Evaluation Time includes thorough review of current medical information, gathering information on past medical history/social history and prior level of function, completion of standardized testing/informal observation of tasks, assessment of data and education on plan of care and goals.     CPT codes:  [x] Low Complexity PT evaluation 71761  [] Moderate Complexity PT evaluation 56077  [] High Complexity PT evaluation 40872  [] PT Re-evaluation 88947  [] Gait training 01106 0 minutes  [] Manual therapy 32833 0 minutes  [x] Therapeutic activities 39608 8 minutes  [] Therapeutic exercises 88556 0 minutes  [] Neuromuscular reeducation 01245 0 minutes     Agata Do, PT, DPT  [id number]

## 2020-11-23 NOTE — PROGRESS NOTES
Occupational Therapy  OCCUPATIONAL THERAPY INITIAL EVALUATION      Date:2020  Patient Name: Storm Horn  MRN: [id number]  : 1940  Room: [address] [place] [address]    Evaluating OT: Gera Headley OTR/PREMA #[id number]    Referring physician: ZULLY Paredes CNP   AM-PAC Daily Activity Raw Score:   Recommended Adaptive Equipment: TBD     Reason for Admission/Diagnosis: Near syncope   Pertinent Medical History/Surgery: CAD, skin cancer, DM, GERD, HLD, HTN, MI, chronic A-fib, UTI, FABIOLA, hematuria     Precautions:  Falls, tele     Home Living: Pt lives alone in a mobile home with ramp to enter. Bedroom/bathroom, along with laundry, is located on first floor. Bathroom setup: Walk-in shower with shower chair   Equipment owned: shower chair, fww, spc  Prior Level of Function:   I/mod I with ADLs ,  assist with IADLs. Patient's son visits from [place] [employer] to assist with home management and grocery shopping. Patient was using spc as needed for functional mobility.    Driving: yes                             Occupation: Retired- worked in [employer] as a    [employer]uf[employer]    Pain Level: No pain reported  Cognition: A&O: self:yes, place:yes, time: yes, situation:yes  Follows 2-3 step directions   Memory:  good    Sequencing:  good    Problem solving:  good    Judgement/safety:  fair      Functional Assessment:   Initial Eval Status  Date: 20 Treatment Status  Date: Short Term Goals=LTG  Treatment frequency: PRN 1-3 x/week   Feeding Set-up/Stand by Assist   Modified [place]    Grooming Stand by Assist   Modified [place]     UB Dressing Minimal Assist   Doffed/donned hospital gown with verbal cues for positioning  Modified [place]    LB Dressing Supervision   Donned/ doffed socks with increased time and effort required, verbal cues for technique  Modified [place]    Bathing Minimal Assist   Modified [place]    Toileting Minimal Assist   Modified [place]    Bed Mobility  Supine to sit: SBA   Sit to supine: SBA  Supine to sit: Mod I   Sit to supine: Mod I   Functional Transfers Sit to stand: SBA  Stand to sit: SBA  Toilet Transfer: SBA  I/mod I using LRD     Functional Mobility SBA/CGA  Completed functional mobility in bedroom/bathroom and hallway using fww to simulate household distance   I/mod I using LRD   Functional household distance   Balance Sitting:     Static:SBA    Dynamic:SBA  Standing: SBA     Activity Tolerance Fair  Good   Visual/  Perceptual Glasses: yes         Safety       Fair                  Good     Upper Extremities:    Treatment Status  Date: Short Term Goals=LTG   B UE UE ROM:   RUE:  WFL  LUE:  WFL    Strength: RUE:  4-/5 LUE:  4-/5     Strength: B: 4/5                     Coordination Fine Motor Coordination:  WFL      Gross Motor Coordination:   WFL                        Hand dominance: R handed    Hearing: WFL  Sensation:  No acute c/o numbness or tingling, patient reports peripheral neuropathy in B UE/LE at baseline  Tone:  WFL  Edema: none observed B UE                            Comments: Nursing approved OT session. Upon arrival, patient semi-supine in ebd and agreeable to session. OT evaluation performed with education provided regarding the purpose and benefits of OT session, along with mobility and I/ADL completion. PAt end of session, patient semi-supine in bed with call light and phone within reach, along with all lines and tubes intact. Nursing notified.   Skilled monitoring of vitals-  Skilled monitoring of the patient's response throughout treatment. Patient would  benefit from continued skilled OT during hospitalization to maximize functional outcomes as they relate to I/ADLs and functional mobility.      Eval Complexity: Low    Assessment of current deficits   Functional mobility [x]  ADLs [x] Strength [x]  Cognition []  Functional transfers  [x] IADLs [x] Safety Awareness [x]  Endurance/Activity tolerance [x]  Fine Motor Coordination [] Balance [x] Vision/perception [] Sensation []   Gross Motor Coordination [] ROM [] Delirium []                  Motor Control []    Plan of Care:  OT 1-3x/week for 1-2 weeks PRN   [x] ADL retraining/AE recommendations to maximize patient safety and performance with self-care   [x] Energy Conservation Techniques/Strategies      [] Neuromuscular Re-Education     [x] Functional Transfer Training to maximize safety utilizing LRD          [x] Functional Mobility Training to maximize safety utilizing LRD       [] Cognitive Re-Training         [] Splinting/Positioning Needs           [x] Therapeutic Activity to improve activity tolerance for functional activities and ADLs    [x]Therapeutic Exercise to improve UE strength for functional transfers and ADLs   [] Visual/Perceptual   [x] Delirium Prevention/Treatment to maximize functional outcomes  [x] Positioning to Improve Functional [place], Safety, and Skin Integrity   [x] Patient and/or Family Education to Increase Safety and Functional [place]   [] Other:     Rehab Potential: Good for established goals    Patient / Family Goal: To return home     Evaluation time includes thorough review of current medical information, gathering information on past medical & social history & PLOF, completion of standardized testing, informal observation of tasks, consultation with other medical professions/disciplines, assessment of data & development of POC/goals. Patient and/or family were instructed diagnosis, prognosis/goals and plan of care. yes Demonstrated good understanding.       Time In: 13:28  Time Out: 13:45  Total Treatment Time: 17 minutes   Min Units   OT Eval Low 97165 X 1   OT Eval Medium 75940     OT Eval High 62972     OT Re-Eval [id number]     Therapeutic Ex 87629     Therapeutic Activities 85498     ADL/Self Care 63064     Orthotic Management 19513     Neuro Re-Ed 24090     Non-Billable Time     TOTAL TIMED TREATMENT

## 2020-11-23 NOTE — PROGRESS NOTES
11/23/2020 3:37 PM  Service: Urology  Group: [facility] (Samuel/Monika/Demetrius)    Latanya Montoya  [id number]    Subjective:    Awake and alert  Denies pain or discomfort  Still had leaking with external catheter on  Mcduffie was inserted yesterday evening  Urine is yellow  No pain or discomfort     Review of Systems  Constitutional: No fever or chills   Respiratory: negative for cough and hemoptysis  Cardiovascular: negative for chest pain and dyspnea  Gastrointestinal: negative for abdominal pain, diarrhea, nausea and vomiting   : See above  Derm: negative for rash and skin lesion(s)  Neurological: negative for seizures and tremors  Musculoskeletal: Negative    Psychiatric: Negative   All other reviews are negative      Scheduled Meds:   sacubitril-valsartan  0.5 tablet Oral BID    clotrimazole-betamethasone   Topical BID    sodium chloride flush  10 mL Intravenous 2 times per day    enoxaparin  40 mg Subcutaneous Daily    insulin lispro  0-6 Units Subcutaneous TID WC    insulin lispro  0-3 Units Subcutaneous Nightly    atorvastatin  20 mg Oral Nightly    famotidine  20 mg Oral BID    ferrous sulfate  325 mg Oral BID WC    flecainide  50 mg Oral BID    insulin glargine  48 Units Subcutaneous Nightly    rOPINIRole  0.25 mg Oral Nightly    vitamin B-12  1,000 mcg Oral Daily       Objective:  Vitals:    11/23/20 1319   BP: (!) 113/58   Pulse: 97   Resp: 16   Temp:    SpO2: 99%         Allergies: Patient has no known allergies.     General Appearance: alert and oriented to person, place and time and in no acute distress  Skin: no rash or erythema  Head: normocephalic and atraumatic  Pulmonary/Chest: normal air movement, no respiratory distress  Abdomen: soft, non-tender, non-distended  Genitourinary: mcduffie draining yellow urine   Extremities: no cyanosis, clubbing or edema         Labs:     Recent Labs     11/23/20  0633      K 3.8      CO2 20*   BUN 18   CREATININE 0.9   GLUCOSE 111*   CALCIUM 8.9       Lab Results   Component Value Date    HGB 10.4 11/22/2020    HCT 33.5 11/22/2020         Assessment/Plan:  BPH s/p recent TURP  Urinary Retention   Hx of gross hematuria       Cont the mcduffie catheter  Per nursing had about 600cc of retained urine when mcduffie was inserted yesterday   Will maintain mcduffie catheter for now  Change every 4 weeks  Will need outpatient voiding trial   No further  interventions are planned at this time  Please call with any further questions or concerns  Thank you for allowing us to participate in his care             Grace Stevens, [address] [facility]  Urology

## 2020-11-24 VITALS
DIASTOLIC BLOOD PRESSURE: 59 MMHG | WEIGHT: 182 LBS | BODY MASS INDEX: 27.58 KG/M2 | RESPIRATION RATE: 18 BRPM | OXYGEN SATURATION: 97 % | HEIGHT: 68 IN | HEART RATE: 84 BPM | SYSTOLIC BLOOD PRESSURE: 118 MMHG | TEMPERATURE: 98.2 F

## 2020-11-24 LAB
METER GLUCOSE: 75 MG/DL (ref 74–99)
METER GLUCOSE: 97 MG/DL (ref 74–99)

## 2020-11-24 PROCEDURE — 6370000000 HC RX 637 (ALT 250 FOR IP): Performed by: INTERNAL MEDICINE

## 2020-11-24 PROCEDURE — 2580000003 HC RX 258: Performed by: NURSE PRACTITIONER

## 2020-11-24 PROCEDURE — 6370000000 HC RX 637 (ALT 250 FOR IP): Performed by: NURSE PRACTITIONER

## 2020-11-24 PROCEDURE — 82962 GLUCOSE BLOOD TEST: CPT

## 2020-11-24 PROCEDURE — G0378 HOSPITAL OBSERVATION PER HR: HCPCS

## 2020-11-24 PROCEDURE — 96372 THER/PROPH/DIAG INJ SC/IM: CPT

## 2020-11-24 PROCEDURE — 6360000002 HC RX W HCPCS: Performed by: NURSE PRACTITIONER

## 2020-11-24 RX ORDER — CLOTRIMAZOLE AND BETAMETHASONE DIPROPIONATE 10; .5 MG/ML; MG/ML
LOTION TOPICAL
Qty: 1 BOTTLE | Refills: 0 | Status: SHIPPED | OUTPATIENT
Start: 2020-11-24

## 2020-11-24 RX ORDER — POLYVINYL ALCOHOL 14 MG/ML
1 SOLUTION/ DROPS OPHTHALMIC PRN
Status: DISCONTINUED | OUTPATIENT
Start: 2020-11-24 | End: 2020-11-24 | Stop reason: HOSPADM

## 2020-11-24 RX ADMIN — Medication 1000 MCG: at 10:00

## 2020-11-24 RX ADMIN — CLOTRIMAZOLE AND BETAMETHASONE DIPROPIONATE: 10; .5 LOTION TOPICAL at 10:00

## 2020-11-24 RX ADMIN — FLECAINIDE ACETATE 50 MG: 100 TABLET ORAL at 10:00

## 2020-11-24 RX ADMIN — SACUBITRIL AND VALSARTAN 0.5 TABLET: 49; 51 TABLET, FILM COATED ORAL at 10:00

## 2020-11-24 RX ADMIN — FERROUS SULFATE TAB 325 MG (65 MG ELEMENTAL FE) 325 MG: 325 (65 FE) TAB at 10:00

## 2020-11-24 RX ADMIN — ENOXAPARIN SODIUM 40 MG: 40 INJECTION SUBCUTANEOUS at 10:00

## 2020-11-24 RX ADMIN — SODIUM CHLORIDE, PRESERVATIVE FREE 10 ML: 5 INJECTION INTRAVENOUS at 10:00

## 2020-11-24 RX ADMIN — FAMOTIDINE 20 MG: 20 TABLET ORAL at 10:00

## 2020-11-24 RX ADMIN — POLYVINYL ALCOHOL 1 DROP: 14 SOLUTION/ DROPS OPHTHALMIC at 11:59

## 2020-11-24 ASSESSMENT — PAIN SCALES - GENERAL: PAINLEVEL_OUTOF10: 0

## 2020-11-24 NOTE — DISCHARGE SUMMARY
Physician Discharge Summary     Patient ID:  Guanako Canseco  [id number]  [de-identified] y.o.  1940    Admit date: 11/20/2020    Discharge date and time:  11/24/2020    Discharge Diagnoses: Principal Problem:    Syncope, near  Active Problems:    Hypertension    Type 2 diabetes mellitus with hyperglycemia, with long-term current use of insulin (HCC)    Chronic atrial fibrillation (HCC)    Gastroesophageal reflux disease without esophagitis    HLD (hyperlipidemia)    RLS (restless legs syndrome)    UTI (urinary tract infection)    Dehydration    Leukocytosis    CHF (congestive heart failure) ([facility] [address]    Cardiomegaly    FABIOLA (acute kidney injury) ([facility] [address]  Resolved Problems:    * No resolved hospital problems. *      Consults: IP CONSULT TO SOCIAL WORK  IP CONSULT TO CASE MANAGEMENT  IP CONSULT TO UROLOGY    Procedures: See below    Hospital Course: Admit to telemetry for near syncope  Patient reports continued lightheadedness today. He denies any dizziness or vertiginous symptoms. States is exacerbated by standing or sitting up.   Decrease Entresto dose today  Orthostatic vital signs q shift--negative repeat today  Troponin negative  IV fluids given with improvement of his symptoms-Echo reviewed-ejection fraction 40 to 45% with moderate LVH   --recommend follow-up with cardiology as an outpatient-no immediate need for such     FABIOLA with urinary incontinence-patient is status post recent TURP by urology  FABIOLA resolved  IV fluids completed  Consult urology to continue to follow and for incontinence and surgery-Consult appreciated     Urinary retention-Champion inserted-recommended continue Champion and follow-up as outpatient with     UTI  Afebrile, monitor blood work  Rocephin-completed   DM  Sliding scale  Monitor blood glucose level  Adequately controlled      HTN/HLD  Decreased dose Entresto secondary to lightheadedness     Tinea cruris--Lotrimin ordered    Discharge home today        Discharge Exam:  See progress note from

## 2020-11-24 NOTE — PROGRESS NOTES
CLINICAL PHARMACY NOTE: MEDS TO [address] [facility] Patient?: No  Total # of Prescriptions Filled: 1   The following medications were delivered to the patient:  · Clotrimazole-bethamethasone 1-0.05 cream  Total # of Interventions Completed: 3  Time Spent (min): 45    Additional Documentation:      Delivered to patient

## 2020-11-24 NOTE — PROGRESS NOTES
Subjective:  Feels better no more lightheadedness  No CP or SOB  No fever or chills   No uncontrolled pain  No vomiting or diarrhea     Objective:    BP (!) 150/72   Pulse 86   Temp 98.3 °F (36.8 °C) (Temporal)   Resp 18   Ht 5' 8\" (1.727 m) Comment: Per chart review- added 11/5/20  Wt 182 lb (82.6 kg)   SpO2 95%   BMI 27.67 kg/m²     24HR INTAKE/OUTPUT:      Intake/Output Summary (Last 24 hours) at 11/24/2020 8783  Last data filed at 11/24/2020 0503  Gross per 24 hour   Intake 480 ml   Output 1200 ml   Net -720 ml       General appearance: NAD, conversant  Neck: FROM, supple   Lungs: Clear bilaterally no wheezes, no rhonchi, no crackles  CV: RRR, no MRGs; normal carotid upstroke and amplitude without Bruits  Abdomen: Soft, non-tender; no masses or HSM  Extremities: No edema, no cyanosis, no clubbing  Skin: Intact no rash, no lesions, no ulcers    Psych: Alert and oriented normal affect  Neuro: Nonfocal  Most Recent Labs  Lab Results   Component Value Date    WBC 8.1 11/22/2020    HGB 10.4 (L) 11/22/2020    HCT 33.5 (L) 11/22/2020     11/22/2020     11/23/2020    K 3.8 11/23/2020     11/23/2020    CREATININE 0.9 11/23/2020    BUN 18 11/23/2020    CO2 20 (L) 11/23/2020    GLUCOSE 111 (H) 11/23/2020    ALT 10 11/20/2020    AST 12 11/20/2020    INR 1.7 11/20/2020     Recent Labs     11/23/20  0633   MG 1.8     Lab Results   Component Value Date    CALCIUM 8.9 11/23/2020        CT Head WO Contrast   Final Result   No acute intracranial abnormality. XR CHEST PORTABLE   Final Result   1. Cardiomegaly. No findings of failure or gross pneumonia.           Assessment    Principal Problem:    Syncope, near  Active Problems:    Hypertension    Type 2 diabetes mellitus with hyperglycemia, with long-term current use of insulin (HCC)    Chronic atrial fibrillation (HCC)    Gastroesophageal reflux disease without esophagitis    HLD (hyperlipidemia)    RLS (restless legs syndrome)    UTI (urinary tract infection)    Dehydration    Leukocytosis    CHF (congestive heart failure) ([facility])    Cardiomegaly    FABIOLA (acute kidney injury) ([facility] [address]  Resolved Problems:    * No resolved hospital problems. *      Plan:    Admit to telemetry for near syncope  Patient reports continued lightheadedness today. He denies any dizziness or vertiginous symptoms. States is exacerbated by standing or sitting up.   Decrease Entresto dose today  Orthostatic vital signs q shift--negative repeat today  Troponin negative  IV fluids given with improvement of his symptoms-Echo reviewed-ejection fraction 40 to 45% with moderate LVH   --recommend follow-up with cardiology as an outpatient-no immediate need for such     FABIOLA with urinary incontinence-patient is status post recent TURP by urology  FABIOLA resolved  IV fluids completed  Consult urology to continue to follow and for incontinence and surgery-Consult appreciated     Urinary retention-Champion inserted-recommended continue Champion and follow-up as outpatient with     UTI  Afebrile, monitor blood work  Rocephin-completed   DM  Sliding scale  Monitor blood glucose level  Adequately controlled      HTN/HLD  Decreased dose Entresto secondary to lightheadedness     Tinea cruris--Lotrimin ordered    Discharge home today    Electronically signed by Agustin Whitehead MD on 11/24/2020 at 6:38 AM

## 2020-12-20 PROBLEM — N39.0 UTI (URINARY TRACT INFECTION): Status: RESOLVED | Noted: 2020-11-20 | Resolved: 2020-12-20

## 2020-12-20 PROBLEM — E86.0 DEHYDRATION: Status: RESOLVED | Noted: 2020-11-20 | Resolved: 2020-12-20

## 2021-01-04 ENCOUNTER — APPOINTMENT (OUTPATIENT)
Dept: GENERAL RADIOLOGY | Age: 81
DRG: 871 | End: 2021-01-04
Payer: MEDICARE

## 2021-01-04 ENCOUNTER — HOSPITAL ENCOUNTER (INPATIENT)
Age: 81
LOS: 3 days | Discharge: HOME | DRG: 871 | End: 2021-01-07
Attending: EMERGENCY MEDICINE | Admitting: INTERNAL MEDICINE
Payer: MEDICARE

## 2021-01-04 DIAGNOSIS — U07.1 COVID-19: ICD-10-CM

## 2021-01-04 DIAGNOSIS — R65.20 SEPSIS WITH ACUTE RENAL FAILURE WITHOUT SEPTIC SHOCK, DUE TO UNSPECIFIED ORGANISM, UNSPECIFIED ACUTE RENAL FAILURE TYPE (HCC): Primary | ICD-10-CM

## 2021-01-04 DIAGNOSIS — E86.0 DEHYDRATION: ICD-10-CM

## 2021-01-04 DIAGNOSIS — N17.9 AKI (ACUTE KIDNEY INJURY) (HCC): ICD-10-CM

## 2021-01-04 DIAGNOSIS — N17.9 SEPSIS WITH ACUTE RENAL FAILURE WITHOUT SEPTIC SHOCK, DUE TO UNSPECIFIED ORGANISM, UNSPECIFIED ACUTE RENAL FAILURE TYPE (HCC): Primary | ICD-10-CM

## 2021-01-04 DIAGNOSIS — A41.9 SEPSIS WITH ACUTE RENAL FAILURE WITHOUT SEPTIC SHOCK, DUE TO UNSPECIFIED ORGANISM, UNSPECIFIED ACUTE RENAL FAILURE TYPE (HCC): Primary | ICD-10-CM

## 2021-01-04 PROBLEM — E87.1 HYPONATREMIA: Status: ACTIVE | Noted: 2021-01-04

## 2021-01-04 PROBLEM — E87.5 HYPERKALEMIA: Status: ACTIVE | Noted: 2021-01-04

## 2021-01-04 PROBLEM — I95.9 HYPOTENSION: Status: ACTIVE | Noted: 2021-01-04

## 2021-01-04 LAB
ALBUMIN SERPL-MCNC: 3.5 G/DL (ref 3.5–5.2)
ALP BLD-CCNC: 113 U/L (ref 40–129)
ALT SERPL-CCNC: 13 U/L (ref 0–40)
ANION GAP SERPL CALCULATED.3IONS-SCNC: 16 MMOL/L (ref 7–16)
AST SERPL-CCNC: 26 U/L (ref 0–39)
BACTERIA: ABNORMAL /HPF
BASOPHILS ABSOLUTE: 0.08 E9/L (ref 0–0.2)
BASOPHILS RELATIVE PERCENT: 1.1 % (ref 0–2)
BILIRUB SERPL-MCNC: 0.6 MG/DL (ref 0–1.2)
BILIRUBIN URINE: NEGATIVE
BLOOD, URINE: ABNORMAL
BUN BLDV-MCNC: 52 MG/DL (ref 8–23)
CALCIUM SERPL-MCNC: 9.3 MG/DL (ref 8.6–10.2)
CHLORIDE BLD-SCNC: 92 MMOL/L (ref 98–107)
CLARITY: ABNORMAL
CO2: 16 MMOL/L (ref 22–29)
COLOR: YELLOW
CORTISOL TOTAL: 21.28 MCG/DL (ref 2.68–18.4)
CREAT SERPL-MCNC: 1.9 MG/DL (ref 0.7–1.2)
EOSINOPHILS ABSOLUTE: 0.04 E9/L (ref 0.05–0.5)
EOSINOPHILS RELATIVE PERCENT: 0.5 % (ref 0–6)
EPITHELIAL CELLS, UA: ABNORMAL /HPF
GFR AFRICAN AMERICAN: 41
GFR NON-AFRICAN AMERICAN: 34 ML/MIN/1.73
GLUCOSE BLD-MCNC: 357 MG/DL (ref 74–99)
GLUCOSE URINE: >=1000 MG/DL
HCT VFR BLD CALC: 38.4 % (ref 37–54)
HEMOGLOBIN: 12.3 G/DL (ref 12.5–16.5)
IMMATURE GRANULOCYTES #: 0.06 E9/L
IMMATURE GRANULOCYTES %: 0.8 % (ref 0–5)
KETONES, URINE: NEGATIVE MG/DL
LACTIC ACID, SEPSIS: 2.4 MMOL/L (ref 0.5–1.9)
LACTIC ACID: 1.6 MMOL/L (ref 0.5–2.2)
LEUKOCYTE ESTERASE, URINE: ABNORMAL
LIPASE: 103 U/L (ref 13–60)
LYMPHOCYTES ABSOLUTE: 1.05 E9/L (ref 1.5–4)
LYMPHOCYTES RELATIVE PERCENT: 14.1 % (ref 20–42)
MAGNESIUM: 2.1 MG/DL (ref 1.6–2.6)
MCH RBC QN AUTO: 26 PG (ref 26–35)
MCHC RBC AUTO-ENTMCNC: 32 % (ref 32–34.5)
MCV RBC AUTO: 81.2 FL (ref 80–99.9)
METER GLUCOSE: 486 MG/DL (ref 74–99)
METER GLUCOSE: >500 MG/DL (ref 74–99)
MONOCYTES ABSOLUTE: 1.01 E9/L (ref 0.1–0.95)
MONOCYTES RELATIVE PERCENT: 13.5 % (ref 2–12)
NEUTROPHILS ABSOLUTE: 5.23 E9/L (ref 1.8–7.3)
NEUTROPHILS RELATIVE PERCENT: 70 % (ref 43–80)
NITRITE, URINE: NEGATIVE
PDW BLD-RTO: 16.3 FL (ref 11.5–15)
PH UA: 5.5 (ref 5–9)
PLATELET # BLD: 272 E9/L (ref 130–450)
PMV BLD AUTO: ABNORMAL FL (ref 7–12)
POTASSIUM SERPL-SCNC: 4.4 MMOL/L (ref 3.5–5)
POTASSIUM SERPL-SCNC: 5.2 MMOL/L (ref 3.5–5)
PROTEIN UA: 100 MG/DL
RBC # BLD: 4.73 E12/L (ref 3.8–5.8)
RBC UA: ABNORMAL /HPF (ref 0–2)
SARS-COV-2, NAAT: DETECTED
SODIUM BLD-SCNC: 124 MMOL/L (ref 132–146)
SPECIFIC GRAVITY UA: 1.02 (ref 1–1.03)
TOTAL CK: 50 U/L (ref 20–200)
TOTAL PROTEIN: 7.7 G/DL (ref 6.4–8.3)
TROPONIN: 0.02 NG/ML (ref 0–0.03)
UROBILINOGEN, URINE: 0.2 E.U./DL
WBC # BLD: 7.5 E9/L (ref 4.5–11.5)
WBC UA: >20 /HPF (ref 0–5)
YEAST: PRESENT /HPF

## 2021-01-04 PROCEDURE — 83690 ASSAY OF LIPASE: CPT

## 2021-01-04 PROCEDURE — 2580000003 HC RX 258: Performed by: EMERGENCY MEDICINE

## 2021-01-04 PROCEDURE — 87088 URINE BACTERIA CULTURE: CPT

## 2021-01-04 PROCEDURE — 6360000002 HC RX W HCPCS: Performed by: EMERGENCY MEDICINE

## 2021-01-04 PROCEDURE — 96374 THER/PROPH/DIAG INJ IV PUSH: CPT

## 2021-01-04 PROCEDURE — 83735 ASSAY OF MAGNESIUM: CPT

## 2021-01-04 PROCEDURE — 87040 BLOOD CULTURE FOR BACTERIA: CPT

## 2021-01-04 PROCEDURE — 36415 COLL VENOUS BLD VENIPUNCTURE: CPT

## 2021-01-04 PROCEDURE — 84132 ASSAY OF SERUM POTASSIUM: CPT

## 2021-01-04 PROCEDURE — 85025 COMPLETE CBC W/AUTO DIFF WBC: CPT

## 2021-01-04 PROCEDURE — 83605 ASSAY OF LACTIC ACID: CPT

## 2021-01-04 PROCEDURE — U0002 COVID-19 LAB TEST NON-CDC: HCPCS

## 2021-01-04 PROCEDURE — 6370000000 HC RX 637 (ALT 250 FOR IP): Performed by: NURSE PRACTITIONER

## 2021-01-04 PROCEDURE — 6370000000 HC RX 637 (ALT 250 FOR IP): Performed by: EMERGENCY MEDICINE

## 2021-01-04 PROCEDURE — 82962 GLUCOSE BLOOD TEST: CPT

## 2021-01-04 PROCEDURE — 82550 ASSAY OF CK (CPK): CPT

## 2021-01-04 PROCEDURE — 2500000003 HC RX 250 WO HCPCS: Performed by: EMERGENCY MEDICINE

## 2021-01-04 PROCEDURE — 82533 TOTAL CORTISOL: CPT

## 2021-01-04 PROCEDURE — 2140000000 HC CCU INTERMEDIATE R&B

## 2021-01-04 PROCEDURE — 93005 ELECTROCARDIOGRAM TRACING: CPT | Performed by: EMERGENCY MEDICINE

## 2021-01-04 PROCEDURE — 99283 EMERGENCY DEPT VISIT LOW MDM: CPT

## 2021-01-04 PROCEDURE — 81001 URINALYSIS AUTO W/SCOPE: CPT

## 2021-01-04 PROCEDURE — 96375 TX/PRO/DX INJ NEW DRUG ADDON: CPT

## 2021-01-04 PROCEDURE — 71045 X-RAY EXAM CHEST 1 VIEW: CPT

## 2021-01-04 PROCEDURE — 80053 COMPREHEN METABOLIC PANEL: CPT

## 2021-01-04 PROCEDURE — 84484 ASSAY OF TROPONIN QUANT: CPT

## 2021-01-04 RX ORDER — DEXAMETHASONE 4 MG/1
6 TABLET ORAL ONCE
Status: COMPLETED | OUTPATIENT
Start: 2021-01-04 | End: 2021-01-04

## 2021-01-04 RX ORDER — FAMOTIDINE 20 MG/1
20 TABLET, FILM COATED ORAL DAILY
Status: DISCONTINUED | OUTPATIENT
Start: 2021-01-04 | End: 2021-01-07 | Stop reason: HOSPADM

## 2021-01-04 RX ORDER — ACETAMINOPHEN 650 MG/1
650 SUPPOSITORY RECTAL EVERY 6 HOURS PRN
Status: DISCONTINUED | OUTPATIENT
Start: 2021-01-04 | End: 2021-01-07 | Stop reason: HOSPADM

## 2021-01-04 RX ORDER — TAMSULOSIN HYDROCHLORIDE 0.4 MG/1
0.4 CAPSULE ORAL DAILY
COMMUNITY

## 2021-01-04 RX ORDER — DEXTROSE MONOHYDRATE 50 MG/ML
100 INJECTION, SOLUTION INTRAVENOUS PRN
Status: DISCONTINUED | OUTPATIENT
Start: 2021-01-04 | End: 2021-01-07 | Stop reason: HOSPADM

## 2021-01-04 RX ORDER — ASCORBIC ACID 500 MG
500 TABLET ORAL DAILY
Status: DISCONTINUED | OUTPATIENT
Start: 2021-01-04 | End: 2021-01-07 | Stop reason: HOSPADM

## 2021-01-04 RX ORDER — ASPIRIN 81 MG/1
81 TABLET, CHEWABLE ORAL DAILY
COMMUNITY

## 2021-01-04 RX ORDER — ATORVASTATIN CALCIUM 20 MG/1
20 TABLET, FILM COATED ORAL NIGHTLY
Status: DISCONTINUED | OUTPATIENT
Start: 2021-01-04 | End: 2021-01-07 | Stop reason: HOSPADM

## 2021-01-04 RX ORDER — SODIUM CHLORIDE 9 MG/ML
INJECTION, SOLUTION INTRAVENOUS CONTINUOUS
Status: DISCONTINUED | OUTPATIENT
Start: 2021-01-04 | End: 2021-01-07 | Stop reason: HOSPADM

## 2021-01-04 RX ORDER — ACETAMINOPHEN 325 MG/1
650 TABLET ORAL EVERY 6 HOURS PRN
Status: DISCONTINUED | OUTPATIENT
Start: 2021-01-04 | End: 2021-01-07 | Stop reason: HOSPADM

## 2021-01-04 RX ORDER — DEXTROSE MONOHYDRATE 25 G/50ML
12.5 INJECTION, SOLUTION INTRAVENOUS PRN
Status: DISCONTINUED | OUTPATIENT
Start: 2021-01-04 | End: 2021-01-07 | Stop reason: HOSPADM

## 2021-01-04 RX ORDER — SODIUM CHLORIDE 0.9 % (FLUSH) 0.9 %
10 SYRINGE (ML) INJECTION EVERY 12 HOURS SCHEDULED
Status: DISCONTINUED | OUTPATIENT
Start: 2021-01-04 | End: 2021-01-07 | Stop reason: HOSPADM

## 2021-01-04 RX ORDER — ZINC SULFATE 50(220)MG
50 CAPSULE ORAL DAILY
Status: DISCONTINUED | OUTPATIENT
Start: 2021-01-04 | End: 2021-01-07 | Stop reason: HOSPADM

## 2021-01-04 RX ORDER — CLOTRIMAZOLE AND BETAMETHASONE DIPROPIONATE 10; .5 MG/ML; MG/ML
LOTION TOPICAL 2 TIMES DAILY
Status: DISCONTINUED | OUTPATIENT
Start: 2021-01-04 | End: 2021-01-07 | Stop reason: HOSPADM

## 2021-01-04 RX ORDER — HEPARIN SODIUM 10000 [USP'U]/ML
5000 INJECTION, SOLUTION INTRAVENOUS; SUBCUTANEOUS EVERY 8 HOURS
Status: DISCONTINUED | OUTPATIENT
Start: 2021-01-04 | End: 2021-01-07 | Stop reason: HOSPADM

## 2021-01-04 RX ORDER — NICOTINE POLACRILEX 4 MG
15 LOZENGE BUCCAL PRN
Status: DISCONTINUED | OUTPATIENT
Start: 2021-01-04 | End: 2021-01-07 | Stop reason: HOSPADM

## 2021-01-04 RX ORDER — POLYETHYLENE GLYCOL 3350 17 G/17G
17 POWDER, FOR SOLUTION ORAL DAILY PRN
Status: DISCONTINUED | OUTPATIENT
Start: 2021-01-04 | End: 2021-01-07 | Stop reason: HOSPADM

## 2021-01-04 RX ORDER — ALBUTEROL SULFATE 90 UG/1
2 AEROSOL, METERED RESPIRATORY (INHALATION) EVERY 6 HOURS PRN
Status: DISCONTINUED | OUTPATIENT
Start: 2021-01-04 | End: 2021-01-07 | Stop reason: HOSPADM

## 2021-01-04 RX ORDER — SODIUM CHLORIDE 0.9 % (FLUSH) 0.9 %
10 SYRINGE (ML) INJECTION PRN
Status: DISCONTINUED | OUTPATIENT
Start: 2021-01-04 | End: 2021-01-07 | Stop reason: HOSPADM

## 2021-01-04 RX ORDER — INSULIN GLARGINE 100 [IU]/ML
48 INJECTION, SOLUTION SUBCUTANEOUS NIGHTLY
Status: DISCONTINUED | OUTPATIENT
Start: 2021-01-04 | End: 2021-01-07 | Stop reason: HOSPADM

## 2021-01-04 RX ORDER — FLECAINIDE ACETATE 100 MG/1
50 TABLET ORAL 2 TIMES DAILY
Status: DISCONTINUED | OUTPATIENT
Start: 2021-01-04 | End: 2021-01-07 | Stop reason: HOSPADM

## 2021-01-04 RX ORDER — ROPINIROLE 0.25 MG/1
0.25 TABLET, FILM COATED ORAL NIGHTLY
Status: DISCONTINUED | OUTPATIENT
Start: 2021-01-04 | End: 2021-01-07 | Stop reason: HOSPADM

## 2021-01-04 RX ORDER — LANOLIN ALCOHOL/MO/W.PET/CERES
1000 CREAM (GRAM) TOPICAL DAILY
Status: DISCONTINUED | OUTPATIENT
Start: 2021-01-05 | End: 2021-01-07 | Stop reason: HOSPADM

## 2021-01-04 RX ORDER — FERROUS SULFATE 325(65) MG
325 TABLET ORAL 2 TIMES DAILY WITH MEALS
Status: DISCONTINUED | OUTPATIENT
Start: 2021-01-05 | End: 2021-01-07 | Stop reason: HOSPADM

## 2021-01-04 RX ADMIN — INSULIN HUMAN 4 UNITS: 100 INJECTION, SOLUTION PARENTERAL at 19:53

## 2021-01-04 RX ADMIN — Medication 1 G: at 20:13

## 2021-01-04 RX ADMIN — DEXAMETHASONE 6 MG: 4 TABLET ORAL at 19:53

## 2021-01-04 RX ADMIN — SODIUM CHLORIDE: 9 INJECTION, SOLUTION INTRAVENOUS at 17:35

## 2021-01-04 RX ADMIN — SODIUM BICARBONATE 50 MEQ: 84 INJECTION, SOLUTION INTRAVENOUS at 19:54

## 2021-01-04 RX ADMIN — INSULIN GLARGINE 48 UNITS: 100 INJECTION, SOLUTION SUBCUTANEOUS at 23:58

## 2021-01-04 RX ADMIN — INSULIN LISPRO 3 UNITS: 100 INJECTION, SOLUTION INTRAVENOUS; SUBCUTANEOUS at 23:59

## 2021-01-04 RX ADMIN — CEFEPIME HYDROCHLORIDE 2 G: 2 INJECTION, POWDER, FOR SOLUTION INTRAVENOUS at 19:54

## 2021-01-04 RX ADMIN — HYDROCORTISONE SODIUM SUCCINATE 100 MG: 100 INJECTION, POWDER, FOR SOLUTION INTRAMUSCULAR; INTRAVENOUS at 17:42

## 2021-01-04 ASSESSMENT — PAIN SCALES - GENERAL: PAINLEVEL_OUTOF10: 0

## 2021-01-04 NOTE — ED PROVIDER NOTES
Department of Emergency Medicine   ED  Provider Note  Admit Date/RoomTime: 1/4/2021  5:14 PM  ED Room: 19/19          History of Present Illness:  1/4/21, Time: 5:22 PM EST  Chief Complaint   Patient presents with    Dizziness     states he has been dizzy/ weak for past month without relief    Fatigue                Wade Boles is a [de-identified] y.o. male presenting to the ED for fatigue dizziness, beginning 1 month. The complaint has been persistent, moderate in severity, and worsened by changing position. Patient presents for dizziness fatigue lightheadedness. States been ongoing for 1 month. Denies fevers chills productive cough chest pain. States feels very lightheaded with particularly with position change. He is in a diaper, states he does not self cath but is in a diaper and the urine \"just runs out\". Denies any traumas falls or injuries. States he felt extra tired today, he was found to be hypotensive in triage and brought back to her room. Review of Systems:   Pertinent positives and negatives are stated within HPI, all other systems reviewed and are negative.        --------------------------------------------- PAST HISTORY ---------------------------------------------  Past Medical History:  has a past medical history of CAD (coronary artery disease), Cancer ([facility] [facility] GERD (gastroesophageal reflux disease), Hyperlipidemia, Hypertension, and Myocardial infarction ([facility] Past Surgical History:  has a past surgical history that includes skin biopsy; Cataract removal with implant (Right, 01/04/2018); Cataract removal (Left, 06/05/2018); pr xcapsl ctrc rmvl insj io lens prosth w/o ecp (Left, 6/5/2018); and Cystoscopy (N/A, 11/6/2020). Social History:  reports that he has quit smoking. His smoking use included cigarettes. He smoked 25.00 packs per day. He has never used smokeless tobacco. He reports that he does not drink alcohol.     Family History: family history is not on the hospital encounter of 01/04/21   Troponin   Result Value Ref Range    Troponin 0.02 0.00 - 0.03 ng/mL   CBC Auto Differential   Result Value Ref Range    WBC 7.5 4.5 - 11.5 E9/L    RBC 4.73 3.80 - 5.80 E12/L    Hemoglobin 12.3 (L) 12.5 - 16.5 g/dL    Hematocrit 38.4 37.0 - 54.0 %    MCV 81.2 80.0 - 99.9 fL    MCH 26.0 26.0 - 35.0 pg    MCHC 32.0 32.0 - 34.5 %    RDW 16.3 (H) 11.5 - 15.0 fL    Platelets 570 115 - 383 E9/L    MPV NOT CALC 7.0 - 12.0 fL    Neutrophils % 70.0 43.0 - 80.0 %    Immature Granulocytes % 0.8 0.0 - 5.0 %    Lymphocytes % 14.1 (L) 20.0 - 42.0 %    Monocytes % 13.5 (H) 2.0 - 12.0 %    Eosinophils % 0.5 0.0 - 6.0 %    Basophils % 1.1 0.0 - 2.0 %    Neutrophils Absolute 5.23 1.80 - 7.30 E9/L    Immature Granulocytes # 0.06 E9/L    Lymphocytes Absolute 1.05 (L) 1.50 - 4.00 E9/L    Monocytes Absolute 1.01 (H) 0.10 - 0.95 E9/L    Eosinophils Absolute 0.04 (L) 0.05 - 0.50 E9/L    Basophils Absolute 0.08 0.00 - 0.20 E9/L   CK   Result Value Ref Range    Total CK 50 20 - 200 U/L   Comprehensive Metabolic Panel   Result Value Ref Range    Sodium 124 (L) 132 - 146 mmol/L    Potassium 5.2 (H) 3.5 - 5.0 mmol/L    Chloride 92 (L) 98 - 107 mmol/L    CO2 16 (L) 22 - 29 mmol/L    Anion Gap 16 7 - 16 mmol/L    Glucose 357 (H) 74 - 99 mg/dL    BUN 52 (H) 8 - 23 mg/dL    CREATININE 1.9 (H) 0.7 - 1.2 mg/dL    GFR Non-African American 34 >=60 mL/min/1.73    GFR African American 41     Calcium 9.3 8.6 - 10.2 mg/dL    Total Protein 7.7 6.4 - 8.3 g/dL    Alb 3.5 3.5 - 5.2 g/dL    Total Bilirubin 0.6 0.0 - 1.2 mg/dL    Alkaline Phosphatase 113 40 - 129 U/L    ALT 13 0 - 40 U/L    AST 26 0 - 39 U/L   Magnesium   Result Value Ref Range    Magnesium 2.1 1.6 - 2.6 mg/dL   Urinalysis   Result Value Ref Range    Color, UA Yellow Straw/Yellow    Clarity, UA CLOUDY (A) Clear    Glucose, Ur >=1000 (A) Negative mg/dL    Bilirubin Urine Negative Negative    Ketones, Urine Negative Negative mg/dL    Specific Gravity, UA 1.025 1.005 - 1.030    Blood, Urine LARGE (A) Negative    pH, UA 5.5 5.0 - 9.0    Protein,  (A) Negative mg/dL    Urobilinogen, Urine 0.2 <2.0 E.U./dL    Nitrite, Urine Negative Negative    Leukocyte Esterase, Urine MODERATE (A) Negative   Lipase   Result Value Ref Range    Lipase 103 (H) 13 - 60 U/L   Lactate, Sepsis   Result Value Ref Range    Lactic Acid, Sepsis 2.4 (H) 0.5 - 1.9 mmol/L   CORTISOL   Result Value Ref Range    Cortisol 21.28 (H) 2.68 - 18.40 mcg/dL   COVID-19   Result Value Ref Range    SARS-CoV-2, NAAT DETECTED (A) Not Detected   Microscopic Urinalysis   Result Value Ref Range    WBC, UA >20 (A) 0 - 5 /HPF    RBC, UA 2-5 0 - 2 /HPF    Epithelial Cells, UA FEW /HPF    Bacteria, UA FEW (A) None Seen /HPF    Yeast, UA Present (A) None Seen /HPF   Potassium   Result Value Ref Range    Potassium 4.4 3.5 - 5.0 mmol/L   LACTIC ACID, PLASMA   Result Value Ref Range    Lactic Acid 1.6 0.5 - 2.2 mmol/L   ,       RADIOLOGY:  Interpreted by Radiologist unless otherwise specified  XR CHEST PORTABLE   Final Result   No active process. ------------------------- NURSING NOTES AND VITALS REVIEWED ---------------------------   The nursing notes within the ED encounter and vital signs as below have been reviewed by myself  /78   Pulse 93   Temp 98.1 °F (36.7 °C)   Resp 21   Ht 5' 8.11\" (1.73 m)   Wt 180 lb 12.4 oz (82 kg)   SpO2 100%   BMI 27.40 kg/m²     Oxygen Saturation Interpretation: Normal    The cardiac monitor revealed Paced  with a heart rate in the 80s as interpreted by me. The cardiac monitor was ordered secondary to the patient's heart rate and to monitor the patient for dysrhythmia. CPT 45797    The patients available past medical records and past encounters were reviewed.         ------------------------------ ED COURSE/MEDICAL DECISION MAKING----------------------  Medications   0.9 % sodium chloride infusion ( Intravenous New Bag 1/4/21 0794)   cefepime (MAXIPIME) 2 g IVPB extended (mini-bag) (2 g Intravenous New Bag 1/4/21 1954)   sodium chloride flush 0.9 % injection 10 mL (has no administration in time range)   sodium chloride flush 0.9 % injection 10 mL (has no administration in time range)   polyethylene glycol (GLYCOLAX) packet 17 g (has no administration in time range)   acetaminophen (TYLENOL) tablet 650 mg (has no administration in time range)     Or   acetaminophen (TYLENOL) suppository 650 mg (has no administration in time range)   trimethobenzamide (TIGAN) injection 200 mg (has no administration in time range)   heparin (porcine) injection 5,000 Units (has no administration in time range)   albuterol sulfate  (90 Base) MCG/ACT inhaler 2 puff (has no administration in time range)   glucose (GLUTOSE) 40 % oral gel 15 g (has no administration in time range)   dextrose 50 % IV solution (has no administration in time range)   glucagon (rDNA) injection 1 mg (has no administration in time range)   dextrose 5 % solution (has no administration in time range)   insulin lispro (HUMALOG) injection vial 0-6 Units (has no administration in time range)   insulin lispro (HUMALOG) injection vial 0-3 Units (has no administration in time range)   cefepime (MAXIPIME) 2 g IVPB extended (mini-bag) (has no administration in time range)   ascorbic acid (VITAMIN C) tablet 500 mg (has no administration in time range)   zinc sulfate (ZINCATE) capsule 50 mg (has no administration in time range)   atorvastatin (LIPITOR) tablet 20 mg (has no administration in time range)   famotidine (PEPCID) tablet 20 mg (has no administration in time range)   ferrous sulfate (IRON 325) tablet 325 mg (has no administration in time range)   flecainide (TAMBOCOR) tablet 50 mg (has no administration in time range)   Insulin Glargine (2 Unit Dial) SOPN 60 Units (has no administration in time range)   rOPINIRole (REQUIP) tablet 0.25 mg (has no administration in time range)   sacubitril-valsartan (ENTRESTO) 49-51 MG per tablet 1 tablet (has no administration in time range)   vitamin B-12 (CYANOCOBALAMIN) tablet 1,000 mcg (has no administration in time range)   clotrimazole-betamethasone (LOTRISONE) lotion (has no administration in time range)   hydrocortisone sodium succinate PF (SOLU-CORTEF) injection 100 mg (100 mg Intravenous Given 1/4/21 1742)   dexamethasone (DECADRON) tablet 6 mg (6 mg Oral Given 1/4/21 1953)   insulin regular (HUMULIN R;NOVOLIN R) injection 4 Units (4 Units Intravenous Given 1/4/21 1953)   sodium bicarbonate 8.4 % injection 50 mEq (50 mEq Intravenous Given 1/4/21 1954)   calcium gluconate 1 g in dextrose 5% 100 mL IVPB (1 g Intravenous New Bag 1/4/21 2013)                    Medical Decision Making:     IDr. Makenna am the primary provider of record    IV fluids were initiated, after stress dose steroids patient's blood pressure greatly improved. Evidence of COVID-19, acute renal failure with UTI. Antibiotics initiated, spoke with medicine patient For further care      Re-Evaluations:       Time: 1730  Re-evaluation. Patients symptoms show no change  Repeat physical examination is improved     Sepsis Re-examination  1/4/21   1830PM EST          Vital Signs:   Vitals:    01/04/21 1930 01/04/21 2000 01/04/21 2013 01/04/21 2015   BP: 125/67 115/63  122/78   Pulse: 85 78  93   Resp: 20 19 21   Temp:    98.1 °F (36.7 °C)   SpO2: 100%   100%   Weight:   180 lb 12.4 oz (82 kg)    Height:   5' 8.11\" (1.73 m)      Card/Pulm:  Rhythm: normal rate. Heart Sounds: Normal S1, S2. unlabored breathing. Capillary Refill: normal.  Radial Pulse:  present 2+. Skin:  Warm.         This patient's ED course included: a personal history and physicial examination, multiple bedside re-evaluations, IV medications, cardiac monitoring, continuous pulse oximetry and complex medical decision making and emergency management    This patient has been closely monitored during their ED course. Consultations:  Internal Medicine       Critical Care: Please note that the withdrawal or failure to initiate urgent interventions for this patient would likely result in a life threatening deterioration or permanent disability. Accordingly this patient received 34 minutes of critical care time, excluding separately billable procedures. Counseling: The emergency provider has spoken with the patient and discussed todays results, in addition to providing specific details for the plan of care and counseling regarding the diagnosis and prognosis. Questions are answered at this time and they are agreeable with the plan.       --------------------------------- IMPRESSION AND DISPOSITION ---------------------------------    IMPRESSION  1. Sepsis with acute renal failure without septic shock, due to unspecified organism, unspecified acute renal failure type ([facility]    2. FABIOLA (acute kidney injury) ([facility]    3. COVID-19    4. Dehydration        DISPOSITION  Disposition: Admit to telemetry  Patient condition is stable        NOTE: This report was transcribed using voice recognition software.  Every effort was made to ensure accuracy; however, inadvertent computerized transcription errors may be present       Kirti Argueta DO  01/04/21 0919

## 2021-01-04 NOTE — ED NOTES
FIRST PROVIDER CONTACT ASSESSMENT NOTE      Department of Emergency Medicine   Admit Date: No admission date for patient encounter. Chief Complaint: Dizziness (states he has been dizzy/ weak for past month without relief) and Fatigue      History of Present Illness:    Darryle Rajas is a [de-identified] y.o. male who presents to the ED for dizziness and generalized weakness. He has a manual BP of 64/46. Respirations easy nonlabored. Regular rate and rhythm auscultated without murmur.         -----------------END OF FIRST PROVIDER CONTACT ASSESSMENT NOTE--------------  Electronically signed by ZULLY Doe CNP   DD: 1/4/21               ZULLY Doe CNP  01/04/21 1714

## 2021-01-04 NOTE — ED NOTES
Bed: 19  Expected date: 1/4/21  Expected time:   Means of arrival:   Comments:  217 Eugenio Kramer RN  01/04/21 7476

## 2021-01-05 LAB
ANION GAP SERPL CALCULATED.3IONS-SCNC: 14 MMOL/L (ref 7–16)
BUN BLDV-MCNC: 49 MG/DL (ref 8–23)
CALCIUM SERPL-MCNC: 8.8 MG/DL (ref 8.6–10.2)
CHLORIDE BLD-SCNC: 101 MMOL/L (ref 98–107)
CO2: 17 MMOL/L (ref 22–29)
CREAT SERPL-MCNC: 1.3 MG/DL (ref 0.7–1.2)
EKG ATRIAL RATE: 86 BPM
EKG P AXIS: 49 DEGREES
EKG P-R INTERVAL: 148 MS
EKG Q-T INTERVAL: 416 MS
EKG QRS DURATION: 152 MS
EKG QTC CALCULATION (BAZETT): 497 MS
EKG R AXIS: -164 DEGREES
EKG T AXIS: 21 DEGREES
EKG VENTRICULAR RATE: 86 BPM
GFR AFRICAN AMERICAN: >60
GFR NON-AFRICAN AMERICAN: 53 ML/MIN/1.73
GLUCOSE BLD-MCNC: 433 MG/DL (ref 74–99)
HCT VFR BLD CALC: 32.4 % (ref 37–54)
HEMOGLOBIN: 10.1 G/DL (ref 12.5–16.5)
LACTIC ACID: 1.3 MMOL/L (ref 0.5–2.2)
LIPASE: 52 U/L (ref 13–60)
MCH RBC QN AUTO: 25.2 PG (ref 26–35)
MCHC RBC AUTO-ENTMCNC: 31.2 % (ref 32–34.5)
MCV RBC AUTO: 80.8 FL (ref 80–99.9)
METER GLUCOSE: 317 MG/DL (ref 74–99)
METER GLUCOSE: 372 MG/DL (ref 74–99)
METER GLUCOSE: 413 MG/DL (ref 74–99)
METER GLUCOSE: 475 MG/DL (ref 74–99)
PDW BLD-RTO: 15.9 FL (ref 11.5–15)
PLATELET # BLD: 243 E9/L (ref 130–450)
PMV BLD AUTO: 12.5 FL (ref 7–12)
POTASSIUM REFLEX MAGNESIUM: 4.9 MMOL/L (ref 3.5–5)
RBC # BLD: 4.01 E12/L (ref 3.8–5.8)
SODIUM BLD-SCNC: 132 MMOL/L (ref 132–146)
URINE CULTURE, ROUTINE: NORMAL
WBC # BLD: 4.3 E9/L (ref 4.5–11.5)

## 2021-01-05 PROCEDURE — 6370000000 HC RX 637 (ALT 250 FOR IP): Performed by: NURSE PRACTITIONER

## 2021-01-05 PROCEDURE — 82962 GLUCOSE BLOOD TEST: CPT

## 2021-01-05 PROCEDURE — 6360000002 HC RX W HCPCS: Performed by: NURSE PRACTITIONER

## 2021-01-05 PROCEDURE — 85027 COMPLETE CBC AUTOMATED: CPT

## 2021-01-05 PROCEDURE — 83690 ASSAY OF LIPASE: CPT

## 2021-01-05 PROCEDURE — 2140000000 HC CCU INTERMEDIATE R&B

## 2021-01-05 PROCEDURE — 6370000000 HC RX 637 (ALT 250 FOR IP): Performed by: INTERNAL MEDICINE

## 2021-01-05 PROCEDURE — 51702 INSERT TEMP BLADDER CATH: CPT

## 2021-01-05 PROCEDURE — 2700000000 HC OXYGEN THERAPY PER DAY

## 2021-01-05 PROCEDURE — 80048 BASIC METABOLIC PNL TOTAL CA: CPT

## 2021-01-05 PROCEDURE — 97162 PT EVAL MOD COMPLEX 30 MIN: CPT

## 2021-01-05 PROCEDURE — 36415 COLL VENOUS BLD VENIPUNCTURE: CPT

## 2021-01-05 PROCEDURE — 83605 ASSAY OF LACTIC ACID: CPT

## 2021-01-05 PROCEDURE — 97166 OT EVAL MOD COMPLEX 45 MIN: CPT

## 2021-01-05 PROCEDURE — 97530 THERAPEUTIC ACTIVITIES: CPT

## 2021-01-05 PROCEDURE — 2580000003 HC RX 258: Performed by: NURSE PRACTITIONER

## 2021-01-05 RX ADMIN — Medication 1000 MCG: at 09:40

## 2021-01-05 RX ADMIN — FLECAINIDE ACETATE 50 MG: 100 TABLET ORAL at 21:05

## 2021-01-05 RX ADMIN — INSULIN LISPRO 10 UNITS: 100 INJECTION, SOLUTION INTRAVENOUS; SUBCUTANEOUS at 11:32

## 2021-01-05 RX ADMIN — FLECAINIDE ACETATE 50 MG: 100 TABLET ORAL at 09:40

## 2021-01-05 RX ADMIN — OXYCODONE HYDROCHLORIDE AND ACETAMINOPHEN 500 MG: 500 TABLET ORAL at 00:20

## 2021-01-05 RX ADMIN — HEPARIN SODIUM 5000 UNITS: 10000 INJECTION INTRAVENOUS; SUBCUTANEOUS at 14:49

## 2021-01-05 RX ADMIN — INSULIN LISPRO 12 UNITS: 100 INJECTION, SOLUTION INTRAVENOUS; SUBCUTANEOUS at 16:37

## 2021-01-05 RX ADMIN — FAMOTIDINE 20 MG: 20 TABLET ORAL at 00:20

## 2021-01-05 RX ADMIN — FERROUS SULFATE TAB 325 MG (65 MG ELEMENTAL FE) 325 MG: 325 (65 FE) TAB at 09:41

## 2021-01-05 RX ADMIN — INSULIN LISPRO 12 UNITS: 100 INJECTION, SOLUTION INTRAVENOUS; SUBCUTANEOUS at 06:02

## 2021-01-05 RX ADMIN — FLECAINIDE ACETATE 50 MG: 100 TABLET ORAL at 00:19

## 2021-01-05 RX ADMIN — INSULIN GLARGINE 48 UNITS: 100 INJECTION, SOLUTION SUBCUTANEOUS at 21:53

## 2021-01-05 RX ADMIN — CEFEPIME HYDROCHLORIDE 2 G: 2 INJECTION, POWDER, FOR SOLUTION INTRAVENOUS at 09:41

## 2021-01-05 RX ADMIN — Medication 10 ML: at 00:23

## 2021-01-05 RX ADMIN — ZINC SULFATE 220 MG (50 MG) CAPSULE 50 MG: CAPSULE at 00:46

## 2021-01-05 RX ADMIN — SACUBITRIL AND VALSARTAN 1 TABLET: 49; 51 TABLET, FILM COATED ORAL at 00:20

## 2021-01-05 RX ADMIN — OXYCODONE HYDROCHLORIDE AND ACETAMINOPHEN 500 MG: 500 TABLET ORAL at 09:40

## 2021-01-05 RX ADMIN — ROPINIROLE HYDROCHLORIDE 0.25 MG: 0.25 TABLET, FILM COATED ORAL at 21:05

## 2021-01-05 RX ADMIN — HEPARIN SODIUM 5000 UNITS: 10000 INJECTION INTRAVENOUS; SUBCUTANEOUS at 06:57

## 2021-01-05 RX ADMIN — CEFEPIME HYDROCHLORIDE 2 G: 2 INJECTION, POWDER, FOR SOLUTION INTRAVENOUS at 21:04

## 2021-01-05 RX ADMIN — Medication 10 ML: at 21:06

## 2021-01-05 RX ADMIN — ATORVASTATIN CALCIUM 20 MG: 20 TABLET, FILM COATED ORAL at 21:05

## 2021-01-05 RX ADMIN — FAMOTIDINE 20 MG: 20 TABLET ORAL at 09:40

## 2021-01-05 RX ADMIN — HEPARIN SODIUM 5000 UNITS: 10000 INJECTION INTRAVENOUS; SUBCUTANEOUS at 00:19

## 2021-01-05 RX ADMIN — FERROUS SULFATE TAB 325 MG (65 MG ELEMENTAL FE) 325 MG: 325 (65 FE) TAB at 16:37

## 2021-01-05 RX ADMIN — SACUBITRIL AND VALSARTAN 1 TABLET: 49; 51 TABLET, FILM COATED ORAL at 21:05

## 2021-01-05 RX ADMIN — INSULIN LISPRO 6 UNITS: 100 INJECTION, SOLUTION INTRAVENOUS; SUBCUTANEOUS at 21:52

## 2021-01-05 RX ADMIN — HEPARIN SODIUM 5000 UNITS: 10000 INJECTION INTRAVENOUS; SUBCUTANEOUS at 21:06

## 2021-01-05 RX ADMIN — ZINC SULFATE 220 MG (50 MG) CAPSULE 50 MG: CAPSULE at 09:41

## 2021-01-05 ASSESSMENT — PAIN SCALES - GENERAL
PAINLEVEL_OUTOF10: 0

## 2021-01-05 NOTE — H&P
W/O ECP Left 6/5/2018    CATARACT EXTRACTION WITH IOL LEFT EYE performed by Anamaria Solitario MD at [facility]           Medications Prior to Admission:    Medications Prior to Admission: apixaban (ELIQUIS) 5 MG TABS tablet, Take 5 mg by mouth 2 times daily  tamsulosin (FLOMAX) 0.4 MG capsule, Take 0.4 mg by mouth daily  aspirin 81 MG chewable tablet, Take 81 mg by mouth daily  flecainide (TAMBOCOR) 50 MG tablet, Take 100 mg by mouth 2 times daily Do not know mg   ferrous sulfate (FE TABS 325) 325 (65 Fe) MG EC tablet, Take 325 mg by mouth daily (with breakfast)   sacubitril-valsartan (ENTRESTO) 24-26 MG per tablet, Take 1 tablet by mouth 2 times daily Indications: Take half dose 2 times daily until feeling better   famotidine (PEPCID) 20 MG tablet, Take 20 mg by mouth 2 times daily  atorvastatin (LIPITOR) 20 MG tablet, Take 20 mg by mouth daily  rOPINIRole (REQUIP) 0.25 MG tablet, Take 0.25 mg by mouth nightly  Insulin Glargine (TOUJEO MAX SOLOSTAR SC), Inject 56 Units into the skin nightly   vitamin B-12 (CYANOCOBALAMIN) 1000 MCG tablet, Take 1,000 mcg by mouth daily  clotrimazole-betamethasone (LOTRISONE) 1-0.05 % lotion, Apply topically 2 times daily. Allergies:  Patient has no known allergies. Social History:   TOBACCO:   reports that he has quit smoking. His smoking use included cigarettes. He smoked 25.00 packs per day. He has never used smokeless tobacco.  ETOH:   reports no history of alcohol use. MARITAL STATUS:    OCCUPATION:      Family History:   History reviewed. No pertinent family history.     REVIEW OF SYSTEMS:    General ROS: Weakness fatigue lightheadedness  Hematological and Lymphatic ROS: negative  Endocrine ROS: negative  Respiratory ROS: no cough, shortness of breath, or wheezing  Cardiovascular ROS: no chest pain or dyspnea on exertion  Gastrointestinal ROS: no abdominal pain, change in bowel habits, or black or bloody stools  Genito-Urinary ROS: no dysuria, trouble voiding, or hematuria  Neurological ROS: no TIA or stroke symptoms  negative    Vitals:  BP (!) 94/42   Pulse 98   Temp 97.7 °F (36.5 °C) (Oral)   Resp 16   Ht 5' 8.11\" (1.73 m)   Wt 180 lb 12.4 oz (82 kg)   SpO2 96%   BMI 27.40 kg/m²     PHYSICAL EXAM:  General:  Awake, alert, oriented X 3. Well developed, well nourished, well groomed. No apparent distress. HEENT:  Normocephalic, atraumatic. Pupils equal, round, reactive to light. No scleral icterus. No conjunctival injection. Normal lips, teeth, and gums. No nasal discharge. Neck:  Supple, FROM  Heart:  RRR, no murmurs, gallops, rubs, carotid upstroke normal, no carotid bruits  Lungs:  CTA bilaterally, bilat symmetrical expansion, no wheeze, rales, or rhonchi  Abdomen: Bowel sounds present, soft, nontender, no masses, no organomegaly, no peritoneal signs  Extremities:  No clubbing, cyanosis, or edema  Skin:  Warm and dry, no open lesions or rash  Neuro:  Cranial nerves 2-12 intact, no focal deficits  Vascular: Radial and pedal Pulses 2+  Breast: deferred  Rectal: deferred  Genitalia:  deferred      DATA:     Recent Labs     01/04/21 1745 01/05/21  0355   WBC 7.5 4.3*   HGB 12.3* 10.1*    243     Recent Labs     01/04/21  1745 01/04/21 2021 01/05/21  0355   *  --  132   K 5.2* 4.4 4.9   BUN 52*  --  49*   CREATININE 1.9*  --  1.3*     Recent Labs     01/04/21  1745   PROT 7.7     Recent Labs     01/04/21  1745   AST 26   ALT 13   ALKPHOS 113   BILITOT 0.6     No results for input(s): BNP in the last 72 hours.   Recent Labs     01/04/21  1745   CKTOTAL 50   TROPONINI 0.02       ASSESSMENT:      Principal Problem:    Sepsis ([facility]  Active Problems:    Type 2 diabetes mellitus with hyperglycemia, with long-term current use of insulin ([facility])    HLD (hyperlipidemia)    RLS (restless legs syndrome)    UTI (urinary tract infection)    CHF (congestive heart failure) ([facility])    FABIOLA (acute kidney injury) (HCC)    Hypotension

## 2021-01-05 NOTE — PROGRESS NOTES
Occupational Therapy  OCCUPATIONAL THERAPY INITIAL EVALUATION      Date:2021  Patient Name: Lina Keen  MRN: [id number]  : 1940  Room: [address] [place] [address]    Evaluating OT: Horacio Ovalles, OTR/L #[id number]    Referring physician: ZULLY Paredes CNP  AM-PAC Daily Activity Raw Score:   Recommended Adaptive Equipment: Patient may benefit from a shower chair. Will continue to assess most appropriate discharge recommendations     Reason for Admission/Diagnosis: Sepsis , COVID-19+  Pertinent Medical History/Surgery: CAD, skin cancer, DM, GERD, HLD, HTN, MI, chronic A-fib, DM type II, near syncope, cardiomegaly, FABIOLA, UTI    Precautions: Droplet plus (COVID-19),  Falls, tele     Home Living: Pt lives alone  in a mobile home with ramp to enter. Bedroom/bathroom, along with laundry, are located on the first floor. Bathroom setup: Tub/shower, no modficiations   Equipment owned: fww, spc  Prior Level of Function:   I/mod I with ADLs ,  I/mod I with IADLs. Patient was using spc vs. fww for functional mobility. Patient reports family/friends available to assist upon discharge.    Driving: yes                             Occupation: Retired, worked in construction as a   [employer]    Pain Level: No pain reported  Cognition: A&O: 4/4  Follows 2-3 step directions   Memory:  good    Sequencing:  good    Problem solving:  good    Judgement/safety:  fair      Functional Assessment:   Initial Eval Status  Date: 21 Treatment Status  Date: Short Term Goals=LTG  Treatment frequency: PRN 1-3 x/week   Feeding Modified [place]       Grooming Stand by Assist   Modified [place]     UB Dressing Stand by Assist   Modified [place]    LB Dressing Stand by Assist   Simulated LB dressing   Modified [place]    Bathing Minimal Assist   Modified [place]    Toileting Stand by Assist   Modified [place]    Bed Mobility  Supine to sit: SBA   Sit to supine: SBA Supine to sit: Mod I   Sit to supine: Mod I   Functional Transfers Sit to stand: SBA  Stand to sit: SBA  Using fww    Mod I using LRD  fww vs. spc   Functional Mobility SBA/ CGA  Completed functional mobility in room using fww  Mod I using LRD  fww vs. spc  Functional household distance   Balance Sitting:     Static:Mod I    Dynamic:SBA  Standing: SBA     Activity Tolerance Fair  Good   Visual/  Perceptual Glasses: yes         Safety       Fair                  Good     Upper Extremities:    Treatment Status  Date: Short Term Goals=LTG   B UE UE ROM:   RUE:  WFL  LUE:  WFL    Strength: RUE:  4-/5 LUE:  4-/5     Strength: B:WFL                     Coordination Fine Motor Coordination:  WFL      Gross Motor Coordination:  WFL                      Hand dominance: R handed    Hearing: WFL  Sensation:  No c/o numbness or tingling  Tone:  WFL  Edema: none observed B UE                            Comments:  Upon arrival, patient semi-supine in bed and agreeable to session. OT evaluation performed with education provided regarding the purpose and benefits of OT session, along with mobility and I/ADL completion. Patient demonstrates slight limitations with ADLs/ functional mobility due to decreased activity tolerance. Education provided regarding energy conservation techniques as they relate to ADLs/ functional mobility. Patient verbalized good understanding. At end of session, patient semi-supine in bed with call light and phone within reach, along with all lines and tubes intact. Treatment: OT treatment provided this date includes:    Mobility training-  Instruction/training on safety and improved independence with bed mobility with SBA supine<>sit. SBA sit<>stand with verbal cues for hand placement using fww. Patient completed functional mobility in room to simulate household distance with SBA overall with CGA for turning for safety.  Verbal cues for navigation,.     Skilled monitoring of vitals-  Skilled monitoring of the patient's response throughout treatment. SpO2 at rest 100%, SpO2 during activity 99%, SpO2 at end of session 99% , patient on RA; HR 80s-90s      Patient would  benefit from continued skilled OT during hospitalization to maximize functional outcomes as they relate to I/ADLs and functional mobility. Eval Complexity: Mod  · Evaluation Complexity: Mod Complexity  ? History: Expanded review of medical records and additional review of physical, cognitive, or psychosocial history related to current functional performance  ? Exam: 5+ performance deficits  ? Assistance/Modification: mod/max assistance or modifications required to perform tasks. May have comorbidities that affect occupational performance.       Assessment of current deficits   Functional mobility [x]  ADLs [x] Strength [x]  Cognition []  Functional transfers  [x] IADLs [x] Safety Awareness [x]  Endurance/Activity tolerance [x]  Fine Motor Coordination [] Balance [x] Vision/perception [] Sensation []   Gross Motor Coordination [] ROM [] Delirium []                  Motor Control []    Plan of Care:  OT 1-3x/week for 1-2 weeks PRN   [x] ADL retraining/AE recommendations to maximize patient safety and performance with self-care   [x] Energy Conservation Techniques/Strategies      [] Neuromuscular Re-Education     [x] Functional Transfer Training to maximize safety utilizing LRD          [x] Functional Mobility Training to maximize safety utilizing LRD       [] Cognitive Re-Training         [] Splinting/Positioning Needs           [x] Therapeutic Activity to improve activity tolerance for functional activities and ADLs    [x]Therapeutic Exercise to improve UE strength for functional transfers and ADLs   [] Visual/Perceptual   [x] Delirium Prevention/Treatment to maximize functional outcomes  [x] Positioning to Improve Functional [place], Safety, and Skin Integrity   [x] Patient and/or Family Education to Increase Safety and Functional [place]   [] Other:     Rehab Potential: Good for established goals    Patient / Family Goal:  To return home    Evaluation time includes thorough review of current medical information, gathering information on past medical & social history & PLOF, completion of standardized testing, informal observation of tasks, consultation with other medical professions/disciplines, assessment of data & development of POC/goals. Patient and/or family were instructed diagnosis, prognosis/goals and plan of care. yes Demonstrated good understanding. Time In: 09:45  Time Out: 10:08  Total Treatment Time: 23 minutes   Min Units   OT Eval Low 09530     OT Eval Medium 97166 X 1   OT Eval High 23641     OT Re-Eval [id number]     Therapeutic Ex 19426     Therapeutic Activities 47248 8 1   ADL/Self Care 87731     Orthotic Management 62129     Neuro Re-Ed 48913     Non-Billable Time     TOTAL TIMED TREATMENT       [] Malnutrition indicators have been identified and nursing has been notified to ensure a dietitian consult is ordered.       Mod OT Evaluation + 8  treatment minutes    Ute Morales OTR/L #[id number]

## 2021-01-05 NOTE — CONSULTS
[facility]) 1999     Past Surgical History:        Procedure Laterality Date    CATARACT REMOVAL Left 06/05/2018    Cataract Extraction with IOL Left Eye    CATARACT REMOVAL WITH IMPLANT Right 01/04/2018    CYSTOSCOPY N/A 11/6/2020    CYSTOSCOPY, CLOT EVACUATION, CHATTERJEE CATHETER PLACEMENT performed by Johanna Doshi MD at [address] [facility] IO LENS PROSTH W/O ECP Left 6/5/2018    CATARACT EXTRACTION WITH IOL LEFT EYE performed by Rita Jon MD at [facility]       Current Medications:   Scheduled Meds:   insulin lispro  0-12 Units Subcutaneous TID WC    insulin lispro  0-6 Units Subcutaneous Nightly    sodium chloride flush  10 mL Intravenous 2 times per day    heparin (porcine)  5,000 Units Subcutaneous Q8H    cefepime  2 g Intravenous Q12H    ascorbic acid  500 mg Oral Daily    zinc sulfate  50 mg Oral Daily    atorvastatin  20 mg Oral Nightly    famotidine  20 mg Oral Daily    ferrous sulfate  325 mg Oral BID WC    flecainide  50 mg Oral BID    insulin glargine  48 Units Subcutaneous Nightly    rOPINIRole  0.25 mg Oral Nightly    sacubitril-valsartan  1 tablet Oral BID    vitamin B-12  1,000 mcg Oral Daily    clotrimazole-betamethasone   Topical BID     Continuous Infusions:   sodium chloride 100 mL/hr at 01/05/21 0603    dextrose       PRN Meds:sodium chloride flush, polyethylene glycol, acetaminophen **OR** acetaminophen, trimethobenzamide, albuterol sulfate HFA, glucose, dextrose, glucagon (rDNA), dextrose    Allergies:  Patient has no known allergies.     Social History:   Social History     Socioeconomic History    Marital status:      Spouse name: None    Number of children: None    Years of education: None    Highest education level: None   Occupational History    None   Social Needs    Financial resource strain: None    Food insecurity     Worry: None     Inability: None    Transportation needs     Medical: None Non-medical: None   Tobacco Use    Smoking status: Former Smoker     Packs/day: 25.00     Types: Cigarettes    Smokeless tobacco: Never Used   Substance and Sexual Activity    Alcohol use: No    Drug use: None    Sexual activity: None   Lifestyle    Physical activity     Days per week: None     Minutes per session: None    Stress: None   Relationships    Social connections     Talks on phone: None     Gets together: None     Attends [employer] service: None     Active member of club or organization: None     Attends meetings of clubs or organizations: None     Relationship status: None    Intimate partner violence     Fear of current or ex partner: None     Emotionally abused: None     Physically abused: None     Forced sexual activity: None   Other Topics Concern    None   Social History Narrative    None     Tobacco: No  Alcohol: No  Pets: No  Travel: No    Family History:   History reviewed. No pertinent family history. . Otherwise non-pertinent to the chief complaint. REVIEW OF SYSTEMS:    CONSTITUTIONAL:  No chills, fevers or night sweats. No loss of weight. EYES:  No double vision or drainage from eyes, ears or throat. HEENT:  No neck stiffness. No dysphagia. No drainage from eyes, ears or throat  RESPIRATORY:  No cough, productive sputum or hemoptysis. CARDIOVASCULAR:  No chest pain, palpitations, orthopnea or dyspnea on exertion. GASTROINTESTINAL:  No nausea, vomiting, diarrhea or constipation or hematochezia   GENITOURINARY: See history of present illness  INTEGUMENT/BREAST:  No rash or breast masses. HEMATOLOGIC/LYMPHATIC:  No lymphadenopathy or blood dyscrasics. ALLERGIC/IMMUNOLOGIC:  No anaphylaxis. ENDOCRINE:  No polyuria or polydipsia or temperature intolerance. MUSCULOSKELETAL:  No myalgia or arthralgia. Full ROM. NEUROLOGICAL:  No focal motor sensory deficit. Dizzy and orthostatic  BEHAVIOR/PSYCH:  No psychosis.      PHYSICAL EXAM:    Vitals:    BP (!) 94/42   Pulse 98 Temp 97.7 °F (36.5 °C) (Oral)   Resp 16   Ht 5' 8.11\" (1.73 m)   Wt 180 lb 12.4 oz (82 kg)   SpO2 96%   BMI 27.40 kg/m²   Constitutional: The patient is awake, alert, and oriented. Skin: Warm and dry. No rashes were noted. No jaundice. HEENT: Eyes show round, and reactive pupils. Moist mucous membranes, no ulcerations, no thrush. Neck: Supple to movements. No lymphadenopathy. Chest: No use of accessory muscles to breathe. Symmetrical expansion. Auscultation reveals no wheezing, crackles, or rhonchi. Cardiovascular: S1 and S2 are rhythmic and regular. No murmurs appreciated. Abdomen: Positive bowel sounds to auscultation. Benign to palpation. No masses felt. No hepatosplenomegaly. Genitourinary: Male-Champion  Extremities: No clubbing, no cyanosis, no edema.   Musculoskeletal: Equal and symmetrical  Neurological: No focal but orthostatic  Lines: peripheral      CBC+dif:  Recent Labs     01/04/21  1745 01/05/21  0355   WBC 7.5 4.3*   HGB 12.3* 10.1*   HCT 38.4 32.4*   MCV 81.2 80.8    243   NEUTROABS 5.23  --      No results found for: CRP  No results found for: CRPHS  No results found for: SEDRATE  Lab Results   Component Value Date    ALT 13 01/04/2021    AST 26 01/04/2021    ALKPHOS 113 01/04/2021    BILITOT 0.6 01/04/2021     Lab Results   Component Value Date     01/05/2021    K 4.9 01/05/2021     01/05/2021    CO2 17 01/05/2021    BUN 49 01/05/2021    CREATININE 1.3 01/05/2021    GFRAA >60 01/05/2021    LABGLOM 53 01/05/2021    GLUCOSE 433 01/05/2021    PROT 7.7 01/04/2021    LABALBU 3.5 01/04/2021    CALCIUM 8.8 01/05/2021    BILITOT 0.6 01/04/2021    ALKPHOS 113 01/04/2021    AST 26 01/04/2021    ALT 13 01/04/2021       Lab Results   Component Value Date    PROTIME 18.5 11/20/2020    INR 1.7 11/20/2020       No results found for: TSH    Lab Results   Component Value Date    COLORU Yellow 01/04/2021    PHUR 5.5 01/04/2021    WBCUA >20 01/04/2021    RBCUA 2-5 01/04/2021    YEAST Present 01/04/2021    BACTERIA FEW 01/04/2021    CLARITYU CLOUDY 01/04/2021    SPECGRAV 1.025 01/04/2021    LEUKOCYTESUR MODERATE 01/04/2021    UROBILINOGEN 0.2 01/04/2021    BILIRUBINUR Negative 01/04/2021    BLOODU LARGE 01/04/2021    GLUCOSEU >=1000 01/04/2021       No results found for: RRQ3XFO, BEART, M7LWYUHM, PHART, THGBART, VTS0QPH, PO2ART, LHZ7MNT  Radiology:  XR CHEST PORTABLE   Final Result   No active process. Microbiology:  Pending  No results for input(s): BC in the last 72 hours. No results for input(s): ORG in the last 72 hours. No results for input(s): Jerlyn Sly in the last 72 hours. No results for input(s): STREPNEUMAGU in the last 72 hours. No results for input(s): LP1UAG in the last 72 hours. No results for input(s): ASO in the last 72 hours. No results for input(s): CULTRESP in the last 72 hours. Assessment:  · COVID-19 asymptomatic  · UTI post TURP with incontinence  · Volume depletion    Plan:    · Cont cefepime -pending final cultures  · Check cultures  · Baseline ESR, CRP  · Monitor labs  · Will follow with you    Thank you for having us see this patient in consultation. I will be discussing this case with the treating physicians.       Electronically signed by Taina Espinoza MD on 1/5/2021 at 11:12 AM

## 2021-01-05 NOTE — PROGRESS NOTES
Physical Therapy  Physical Therapy Initial Assessment     Name: Montez Howard  : 1940  MRN: [id number]    Referring Provider: ZULLY Paredes CNP    Date of Service: 2021    Evaluating PT: Rosemarie Lozoya, PT, DPT, [id number]    Room #: 5229/8397-H  Diagnosis: Sepsis  PMHx/PSHx: Skin CA, MI, CAD, DM, HLD, HTN  Precautions: Fall risk, Droplet Plus Isolation (COVID-19)    SUBJECTIVE:    Pt lives alone in a mobile home with ramp and 2 rails to enter. Pt ambulated with [facility] or  prior to admission. Pt reports he uses [facility] more often than [employer].    OBJECTIVE:   Initial Evaluation  Date: 21 Treatment Date: Short Term/ Long Term   Goals   AM-PAC 6 Clicks 76/37     Was pt agreeable to Eval/treatment? Yes     Does pt have pain? No current complaints of pain     Bed Mobility  Rolling: NT  Supine to sit: SBA  Sit to supine: NT  Scooting: SBA to EOB  Rolling: Independent   Supine to sit: Independent   Sit to supine: Independent   Scooting: Independent    Transfers Sit to stand: SBA  Stand to sit: SBA  Stand pivot: SBA with [employer]  Sit to stand: Independent   Stand to sit: Independent   Stand pivot: Mod Independent with [employer]   Ambulation   50 feet with [employer] with SBA  200 feet with [employer] Mod Independent    Stair negotiation: ascended and descended NT  NA   ROM BUE: WFL  BLE: WFL     Strength BUE: WFL  BLE: WFL     Balance Sitting EOB: Supervision  Dynamic Standing: SBA with [employer]  Sitting EOB: Independent   Dynamic Standing: Mod Independent with [employer]     Pt is A & O x: 4 to person, place, month/year, and situation. Sensation: Pt reports chronic hands and feet numbness and tingling due to neuropathy. Edema: Unremarkable. Therapeutic Exercises:  5x STS from chair with SBA    Patient education  Pt educated on PT role in acute care setting.     Patient response to education:   Pt verbalized understanding Pt demonstrated skill Pt requires further education in this area   Yes NA No     ASSESSMENT:    Vitals on RA:  Rest: O2 sat 99%, HR 82 bpm  Supine to sit transfer: O2 sat 100%, HR 92 bpm  Stand pivot to chair: O2 sat 100%, HR 74 bpm  After ambulating 50 feet with [facility]: O2 sat 100%, HR 97 bpm  5x STS exercise: O2 sat 99%, HR 76 bpm  Conclusion of session: O2 sat 99%, HR 87 bpm    Comments:   Pt was supine in bed upon room entry, agreeable to PT evaluation. Vitals and symptoms were monitored closely throughout session. Pt denied dizziness throughout session. Pt ambulates with slow gait speed with fair steadiness with [facility]. Pt ambulated to doorway and back to chair where he completed 5x STS exercise. Pt has good activity tolerance and O2 sat was % with all activity. Pt was left in chair with all needs met at conclusion of session. RN was notified of pt's performance and position in bedside chair. Treatment:  Patient practiced and was instructed in the following treatment:     Therapeutic activities: Pt completed all therapeutic activities noted above. Vitals and symptoms were skillfully monitored with all activity and during rest breaks. Pt was cued for hand placement during sit <> stand transfers. Pt completed multiple transfers from surfaces of varying heights (EOB x1, chair x5). Pt was cued for [facility] technique when ambulating.  Therapeutic exercises: Pt completed 5x STS exercise as noted above. Pt's/family goals:   1. To return home. Patient and or family understand(s) diagnosis, prognosis, and plan of care. Yes. PLAN:    Current Treatment Recommendations   [x] Strengthening     [] ROM   [x] Balance Training   [x] Endurance Training   [x] Transfer Training   [x] Gait Training   [] Stair Training   [] Positioning   [] Safety and Education Training   [] Patient/Caregiver Education   [x] HEP  [] Other     PT care will be provided in accordance with the objectives noted above. Exercises and functional mobility practice will be used as well as appropriate assistive devices or modalities to obtain goals.  Patient and family education will also be administered as needed. Frequency of treatments: 2-5x/week x 2-3 days. Time in: 0800  Time out: 0825    Total Treatment Time 15 minutes     Evaluation Time includes thorough review of current medical information, gathering information on past medical history/social history and prior level of function, completion of standardized testing/informal observation of tasks, assessment of data and education on plan of care and goals.     CPT codes:  [] Low Complexity PT evaluation 09241  [x] Moderate Complexity PT evaluation 95528  [] High Complexity PT evaluation 60504  [] PT Re-evaluation 37468  [] Gait training 89344 0 minutes  [] Manual therapy 39546 0 minutes  [x] Therapeutic activities 69010 15 minutes  [] Therapeutic exercises 96229 0 minutes  [] Neuromuscular reeducation 32252 0 minutes     Refugio Vilchis, PT, DPT  [id number]

## 2021-01-05 NOTE — PROGRESS NOTES
Messaged April CLOVIS Cruz regarding verifying IV fluid rate, mcduffie, and high blood sugars. Awaiting orders.

## 2021-01-05 NOTE — CONSULTS
1/5/2021 3:18 PM  Service: Urology  Group: [facility] (Samuel/Monika/Demetrius)    Keerthi   [id number]     Chief Complaint:    Urinary Retention    History of Present Illness: The patient is a [de-identified] y.o. male patient who presented to the hospital 1 day ago with complaints of dizziness and fatigue. He was found to have COVID-19, FABIOLA, and UTI and admitted for further evaluation. We were asked to see him for FABIOLA and urinary retention. Per nursing, a chatterjee catheter was inserted yesterday for approximately 500 cc with immediate urine output. He denies discomfort prior to insertion of the chatterjee catheter. He denies flank pain. He is known to our practice and is a patient of Dr. Melly Lazo. He has history of urinary retention, BPH, and recently underwent TURP in October 2020. Following this, he had gross hematuria and clot retention. He went to OR on 11/6/20 for cystoscopy with clot evacuation. He has suffered from urinary incontinence and was started on Myrbetriq with recent referral to pelvic floor therapy as well. Currently his chatterjee is draining yellow urine. He denies discomfort. His creatinine has improved from 1.9 to 1.3 with IVFs. Urine culture mixed yovani and blood cultures pending.      Past Medical History:   Diagnosis Date    CAD (coronary artery disease)     [facility] [address] [facility]     skin     Diabetes mellitus ([facility] [address] [facility]     GERD (gastroesophageal reflux disease)     Hyperlipidemia     Hypertension     Myocardial infarction ([facility] [address] 1999         Past Surgical History:   Procedure Laterality Date    CATARACT REMOVAL Left 06/05/2018    Cataract Extraction with IOL Left Eye    CATARACT REMOVAL WITH IMPLANT Right 01/04/2018    CYSTOSCOPY N/A 11/6/2020    CYSTOSCOPY, CLOT EVACUATION, CHATTERJEE CATHETER PLACEMENT performed by Jez Urbina MD at [address] [facility] INS[facility] IO LENS PROSTH W/O ECP Left 6/5/2018    CATARACT EXTRACTION WITH IOL LEFT EYE performed by Demetrio Dobbins MD at [facility]         Medications Prior to Admission:    Medications Prior to Admission: apixaban (ELIQUIS) 5 MG TABS tablet, Take 5 mg by mouth 2 times daily  tamsulosin (FLOMAX) 0.4 MG capsule, Take 0.4 mg by mouth daily  aspirin 81 MG chewable tablet, Take 81 mg by mouth daily  flecainide (TAMBOCOR) 50 MG tablet, Take 100 mg by mouth 2 times daily Do not know mg   ferrous sulfate (FE TABS 325) 325 (65 Fe) MG EC tablet, Take 325 mg by mouth daily (with breakfast)   sacubitril-valsartan (ENTRESTO) 24-26 MG per tablet, Take 1 tablet by mouth 2 times daily Indications: Take half dose 2 times daily until feeling better   famotidine (PEPCID) 20 MG tablet, Take 20 mg by mouth 2 times daily  atorvastatin (LIPITOR) 20 MG tablet, Take 20 mg by mouth daily  rOPINIRole (REQUIP) 0.25 MG tablet, Take 0.25 mg by mouth nightly  Insulin Glargine (TOUJEO MAX SOLOSTAR SC), Inject 56 Units into the skin nightly   vitamin B-12 (CYANOCOBALAMIN) 1000 MCG tablet, Take 1,000 mcg by mouth daily  clotrimazole-betamethasone (LOTRISONE) 1-0.05 % lotion, Apply topically 2 times daily. Allergies:    Patient has no known allergies. Social History:    reports that he has quit smoking. His smoking use included cigarettes. He smoked 25.00 packs per day. He has never used smokeless tobacco. He reports that he does not drink alcohol. Family History:   Non-contributory to this Urological problem  family history is not on file.     Review of Systems:  Constitutional: No fever or chills, tired   Respiratory: positive for shortness of breath and cough   Cardiovascular: negative for chest pain and dyspnea  Gastrointestinal: negative for abdominal pain, diarrhea, nausea and vomiting   Derm: negative for rash and skin lesion(s)  Neurological: negative for seizures and tremors  Musculoskeletal: Negative    Psychiatric: Negative   : As above in the HPI, otherwise negative  All other reviews are negative    Physical Exam:     Vitals:  BP (!) 94/42

## 2021-01-05 NOTE — ACP (ADVANCE CARE PLANNING)
Advance Care Planning     Advance Care Planning Clinical Specialist  Conversation Note      Date of ACP Conversation: 1/4/2021    Conversation Conducted with: Patient with Decision Making Capacity    ACP Clinical Specialist: Cain Landin    *When Decision Maker makes decisions on behalf of the incapacitated patient: Connor Wang is asked to consider and make decisions based on patient values, known preferences, or best interests. Health Care Decision Maker:     Current Designated Health Care Decision Maker:   (If there is a valid [address] named in the [address] [place] [address] Makers\" box in the ACP activity, but it is not visible above, be sure to open that field and then select the health care decision maker relationship (ie \"primary\") in the blank space to the right of the name.) Validate  this information as still accurate & up-to-date; edit [address] field as needed.)    Note: Assess and validate information in current ACP documents, as indicated. If no Decision Maker listed above or available through scanned documents, then:    If no Authorized Decision Maker has previously been identified, then patient chooses [address]:  \"Who would you like to name as your primary health care decision-maker? \"               Name: Kristel Ely        Relationship: Estevanrito Poisson          Phone number: n/a  \"Can this person be reached easily? \" No  \"Who would you like to name as your back-up decision maker? \"   Name: Sharita Perez      Relationship: Daughter          Phone number: [phone]  Russell Pickens this person be reached easily? \" Yes    Note: If the relationship of these Decision-Makers to the patient does NOT follow your state's Next of Kin hierarchy, recommend that patient complete ACP document that meets state-specific requirements to allow them to act on the patient's behalf when appropriate. Care Preferences    Hospitalization:   \"If your health worsens and it becomes clear that your chance of recovery is unlikely, what would your preference be regarding hospitalization? \"    Choice:  [] The patient wants hospitalization  [] The patient prefers comfort-focused treatment without hospitalization. Ventilation: \"If you were in your present state of health and suddenly became very ill and were unable to breathe on your own, what would your preference be about the use of a ventilator (breathing machine) if it were available to you? \"      Would the patient desire the use of ventilator (breathing machine)?: no    \"If your health worsens and it becomes clear that your chance of recovery is unlikely, what would your preference be about the use of a ventilator (breathing machine) if it were available to you? \"     Would the patient desire the use of ventilator (breathing machine)?: No      Resuscitation  \"CPR works best to restart the heart when there is a sudden event, like a heart attack, in someone who is otherwise healthy. Unfortunately, CPR does not typically restart the heart for people who have serious health conditions or who are very sick. \"    \"In the event your heart stopped as a result of an underlying serious health condition, would you want attempts to be made to restart your heart (answer \"yes\" for attempt to resuscitate) or would you prefer a natural death (answer \"no\" for do not attempt to resuscitate)? \" no      NOTE: If the patient has a valid advance directive AND now provides care preference(s) that are inconsistent with that prior directive, advise the patient to consider either: creating a new advance directive that complies with state-specific requirements; or, if that is not possible, orally revoking that prior directive in accordance with state-specific requirements, which must be documented in the EHR. [] Yes   [] No   Educated Patient / MiFi regarding differences between Advance Directives and portable DNR orders.     Length of ACP Conversation in minutes: Conversation Outcomes:  [x] ACP discussion completed  [] Existing advance directive reviewed with patient; no changes to patient's previously recorded wishes  [] New Advance Directive completed  [] Portable Do Not Rescitate prepared for Provider review and signature  [] POLST/POST/MOLST/MOST prepared for Provider review and signature      Follow-up plan:    [] Schedule follow-up conversation to continue planning  [] Referred individual to Provider for additional questions/concerns   [] Advised patient/agent/surrogate to review completed ACP document and update if needed with changes in condition, patient preferences or care setting    [] This note routed to one or more involved healthcare providers

## 2021-01-05 NOTE — ED NOTES
PT produced urine without assistance catheter not needed at this time     Alex Alfonso RN  01/04/21 1921

## 2021-01-06 LAB
METER GLUCOSE: 120 MG/DL (ref 74–99)
METER GLUCOSE: 229 MG/DL (ref 74–99)
METER GLUCOSE: 239 MG/DL (ref 74–99)
METER GLUCOSE: 297 MG/DL (ref 74–99)

## 2021-01-06 PROCEDURE — 6370000000 HC RX 637 (ALT 250 FOR IP): Performed by: NURSE PRACTITIONER

## 2021-01-06 PROCEDURE — 6360000002 HC RX W HCPCS: Performed by: NURSE PRACTITIONER

## 2021-01-06 PROCEDURE — 6370000000 HC RX 637 (ALT 250 FOR IP): Performed by: INTERNAL MEDICINE

## 2021-01-06 PROCEDURE — 2580000003 HC RX 258: Performed by: NURSE PRACTITIONER

## 2021-01-06 PROCEDURE — 97535 SELF CARE MNGMENT TRAINING: CPT

## 2021-01-06 PROCEDURE — 82962 GLUCOSE BLOOD TEST: CPT

## 2021-01-06 PROCEDURE — 2140000000 HC CCU INTERMEDIATE R&B

## 2021-01-06 PROCEDURE — 6370000000 HC RX 637 (ALT 250 FOR IP): Performed by: REGISTERED NURSE

## 2021-01-06 PROCEDURE — 97530 THERAPEUTIC ACTIVITIES: CPT

## 2021-01-06 RX ORDER — FLUCONAZOLE 200 MG/1
200 TABLET ORAL DAILY
Qty: 7 TABLET | Refills: 0 | Status: SHIPPED | OUTPATIENT
Start: 2021-01-06 | End: 2021-01-13

## 2021-01-06 RX ORDER — FLUCONAZOLE 100 MG/1
200 TABLET ORAL DAILY
Status: DISCONTINUED | OUTPATIENT
Start: 2021-01-06 | End: 2021-01-07 | Stop reason: HOSPADM

## 2021-01-06 RX ORDER — ZINC SULFATE 50(220)MG
50 CAPSULE ORAL DAILY
Qty: 30 CAPSULE | Refills: 3 | COMMUNITY
Start: 2021-01-07

## 2021-01-06 RX ORDER — GRANULES FOR ORAL 3 G/1
3 POWDER ORAL ONCE
Status: COMPLETED | OUTPATIENT
Start: 2021-01-06 | End: 2021-01-06

## 2021-01-06 RX ORDER — GRANULES FOR ORAL 3 G/1
3 POWDER ORAL
Qty: 2 EACH | Refills: 0 | Status: SHIPPED | OUTPATIENT
Start: 2021-01-06 | End: 2021-01-10

## 2021-01-06 RX ADMIN — FLECAINIDE ACETATE 50 MG: 100 TABLET ORAL at 08:06

## 2021-01-06 RX ADMIN — Medication 10 ML: at 08:06

## 2021-01-06 RX ADMIN — FLUCONAZOLE 200 MG: 100 TABLET ORAL at 14:26

## 2021-01-06 RX ADMIN — ZINC SULFATE 220 MG (50 MG) CAPSULE 50 MG: CAPSULE at 08:07

## 2021-01-06 RX ADMIN — INSULIN LISPRO 6 UNITS: 100 INJECTION, SOLUTION INTRAVENOUS; SUBCUTANEOUS at 16:54

## 2021-01-06 RX ADMIN — CLOTRIMAZOLE AND BETAMETHASONE DIPROPIONATE 1 APPLICATOR: 10; .5 LOTION TOPICAL at 20:59

## 2021-01-06 RX ADMIN — HEPARIN SODIUM 5000 UNITS: 10000 INJECTION INTRAVENOUS; SUBCUTANEOUS at 06:05

## 2021-01-06 RX ADMIN — ATORVASTATIN CALCIUM 20 MG: 20 TABLET, FILM COATED ORAL at 20:46

## 2021-01-06 RX ADMIN — ACETAMINOPHEN 650 MG: 325 TABLET, FILM COATED ORAL at 23:40

## 2021-01-06 RX ADMIN — ROPINIROLE HYDROCHLORIDE 0.25 MG: 0.25 TABLET, FILM COATED ORAL at 20:46

## 2021-01-06 RX ADMIN — FAMOTIDINE 20 MG: 20 TABLET ORAL at 08:06

## 2021-01-06 RX ADMIN — Medication 1000 MCG: at 08:06

## 2021-01-06 RX ADMIN — INSULIN LISPRO 6 UNITS: 100 INJECTION, SOLUTION INTRAVENOUS; SUBCUTANEOUS at 11:40

## 2021-01-06 RX ADMIN — OXYCODONE HYDROCHLORIDE AND ACETAMINOPHEN 500 MG: 500 TABLET ORAL at 08:06

## 2021-01-06 RX ADMIN — CEFEPIME HYDROCHLORIDE 2 G: 2 INJECTION, POWDER, FOR SOLUTION INTRAVENOUS at 08:06

## 2021-01-06 RX ADMIN — SACUBITRIL AND VALSARTAN 1 TABLET: 49; 51 TABLET, FILM COATED ORAL at 20:46

## 2021-01-06 RX ADMIN — FOSFOMYCIN TROMETHAMINE 1 PACKET: 3 POWDER ORAL at 17:03

## 2021-01-06 RX ADMIN — HEPARIN SODIUM 5000 UNITS: 10000 INJECTION INTRAVENOUS; SUBCUTANEOUS at 14:36

## 2021-01-06 RX ADMIN — FERROUS SULFATE TAB 325 MG (65 MG ELEMENTAL FE) 325 MG: 325 (65 FE) TAB at 17:03

## 2021-01-06 RX ADMIN — FERROUS SULFATE TAB 325 MG (65 MG ELEMENTAL FE) 325 MG: 325 (65 FE) TAB at 08:06

## 2021-01-06 RX ADMIN — HEPARIN SODIUM 5000 UNITS: 10000 INJECTION INTRAVENOUS; SUBCUTANEOUS at 20:46

## 2021-01-06 RX ADMIN — FLECAINIDE ACETATE 50 MG: 100 TABLET ORAL at 20:46

## 2021-01-06 RX ADMIN — Medication 10 ML: at 21:13

## 2021-01-06 RX ADMIN — INSULIN GLARGINE 48 UNITS: 100 INJECTION, SOLUTION SUBCUTANEOUS at 20:57

## 2021-01-06 RX ADMIN — INSULIN LISPRO 5 UNITS: 100 INJECTION, SOLUTION INTRAVENOUS; SUBCUTANEOUS at 20:58

## 2021-01-06 ASSESSMENT — PAIN SCALES - GENERAL
PAINLEVEL_OUTOF10: 0
PAINLEVEL_OUTOF10: 0

## 2021-01-06 NOTE — CARE COORDINATION
Care Coordination:  Script written by ID Fosfomycin is not carried by our pharmacy, they are not aware of pharmacy does not carry .  I have called [facility] and they do not carry and are not aware of any pharmacy that does    Justa Coyne

## 2021-01-06 NOTE — PROGRESS NOTES
[facility]   Department of Pharmacy   Pharmacist Transition of Care Services         Patient Demographics  Name:  Alejandra Renteria   Medical Record Number:  [id number]  Gender:  male   Age:  [de-identified] y.o. YOB: 1940    Readmission Risk: 23%       Pharmacist Review and Summary of Medications     Date of last reviewed/update: 1/6/21     Category Comments   New Medication Started   Fluconazole 200 mg daily x 7 days  Fosfomycin 3 g every 3 days for two more doses     Change in Outpatient Medication      Discontinued/On hold Outpatient Medication       Other          Documentation of Pharmacist Interventions and Follow-up Plan:     The following Pharmacist Transition of Care Services were completed:   [x]  Reviewed and summarized medication changes  []  Entire home medication list was reviewed for accuracy  []  Home medication list was updated or corrected    [x]  Reviewed discharge medication reconciliation  []  Discharge medication list was updated or corrected    [x]  Added information to AVS   []  Patient education was provided on new medications  []  Patient education was provided on medication changes  []  Reviewed the AVS with patient    Additional Interventions:  []  Inpatient prescriber was contacted and the following pharmacy recommendations        were accepted: **     [] Other interventions: **        Pharmacist: Tomasa Baird PharmD, [facility]  Date:  1/6/2021 3:30 PM  Time spent counseling patient face-to-face OR over the phone on medications: 0 minutes

## 2021-01-06 NOTE — DISCHARGE SUMMARY
Physician Discharge Summary     Patient ID:  Jenene Fothergill  [id number]  [de-identified] y.o.  1940    Admit date: 1/4/2021    Discharge date and time: 1/6/2021    Admission Diagnoses:   Patient Active Problem List   Diagnosis    Right cataract    Left eye complaint    Hematuria, gross    Acute blood loss anemia    Hypertension    Type 2 diabetes mellitus with hyperglycemia, with long-term current use of insulin (HCC)    Chronic atrial fibrillation (HCC)    Hematuria    Syncope, near    Gastroesophageal reflux disease without esophagitis    HLD (hyperlipidemia)    RLS (restless legs syndrome)    UTI (urinary tract infection)    Leukocytosis    [facility]F [facility]congestive heart failure) ([facility] [address]    Cardiomegaly    FABIOLA (acute kidney injury) ([facility] [address]    Sepsis (HCC)    Hypotension    Hyperkalemia    Hyponatremia    COVID-19       Discharge Diagnoses: UTI sepsis    Consults: Infectious disease and urology  Procedures:  Champion insertion    Parmova 24 to telemetry for evaluation of COVID-19 sepsis  Decadron  No indication for remdesivir has oxygen saturations are 96 and above on room air  No GGO's on imaging  Supplemental oxygen if needed  Inhalers  IV fluids  IV cefepime switched to fosfomycin by infectious disease  Infectious disease follow-up    Renal failure resolved  Urology followed  Patient to be discharged with Champion in place  Fosfomycin    Recent Labs     01/04/21 1745 01/05/21  0355   WBC 7.5 4.3*   HGB 12.3* 10.1*   HCT 38.4 32.4*    243       Recent Labs     01/04/21 1745 01/04/21 2021 01/05/21  0355   *  --  132   K 5.2* 4.4 4.9   CL 92*  --  101   CO2 16*  --  17*   BUN 52*  --  49*   CREATININE 1.9*  --  1.3*   CALCIUM 9.3  --  8.8       Xr Chest Portable    Result Date: 1/4/2021  EXAMINATION: ONE XRAY VIEW OF THE CHEST 1/4/2021 4:31 pm COMPARISON: 20 November 2020 HISTORY: ORDERING SYSTEM PROVIDED HISTORY: Sepsis TECHNOLOGIST PROVIDED HISTORY: Reason for exam:->Sepsis What reading provider will be dictating this exam?->CRC FINDINGS: Stable pacemaker on the left. Stable cardiomediastinal silhouette with normal pulmonary vascularity. Lungs are clear. Neither costophrenic angle is clearly blunted. No active process. Discharge Exam:    HEENT: NCAT,  PERRLA, No JVD  Heart:  RRR, no murmurs, gallops, or rubs. Lungs:  CTA bilaterally, no wheeze, rales or rhonchi  Abd: bowel sounds present, nontender, nondistended, no masses  Extrem:  No clubbing, cyanosis, or edema    Disposition: home     Patient Condition at Discharge: Stable    Patient Instructions:      Medication List      START taking these medications    fluconazole 200 MG tablet  Commonly known as: DIFLUCAN  Take 1 tablet by mouth daily for 7 days     fosfomycin tromethamine 3 g Pack  Commonly known as: MONUROL  Take 1 packet by mouth every 3 days for 2 doses     zinc sulfate 220 (50 Zn) MG capsule  Commonly known as: ZINCATE  Take 1 capsule by mouth daily  Start taking on: January 7, 2021        Oma Ogden taking these medications    aspirin 81 MG chewable tablet     atorvastatin 20 MG tablet  Commonly known as: LIPITOR     clotrimazole-betamethasone 1-0.05 % lotion  Commonly known as: LOTRISONE  Apply topically 2 times daily.      Eliquis 5 MG Tabs tablet  Generic drug: apixaban     Entresto 24-26 MG per tablet  Generic drug: sacubitril-valsartan     famotidine 20 MG tablet  Commonly known as: PEPCID     ferrous sulfate 325 (65 Fe) MG EC tablet  Commonly known as: FE TABS 325     flecainide 50 MG tablet  Commonly known as: TAMBOCOR     rOPINIRole 0.25 MG tablet  Commonly known as: REQUIP     tamsulosin 0.4 MG capsule  Commonly known as: FLOMAX     TOUJEO MAX SOLOSTAR SC     vitamin B-12 1000 MCG tablet  Commonly known as: CYANOCOBALAMIN           Where to Get Your Medications      You can get these medications from any pharmacy    Bring a paper prescription for each of these medications  · fluconazole 200 MG tablet  · fosfomycin tromethamine 3 g Pack  You don't need a prescription for these medications  · zinc sulfate 220 (50 Zn) MG capsule       Activity: activity as tolerated  Diet: regular diet    Pt has been advised to: Follow-up with Funmilayo Arizmendi DO in 1 week.   Follow-up with consultants as recommended by them    Note that over 30 minutes was spent in preparing discharge papers, discussing discharge with patient, medication review, etc.    Signed:  Bree Hernandez MD  1/6/2021  3:20 PM

## 2021-01-06 NOTE — PROGRESS NOTES
RN explained to patient that mcduffie catheter will be removed and that patient will need to void before being discharged. Patient is upset at having it removed with the likely possibility of having to have it replaced as he has a history of urinary retention. He does not want to have to get the mcduffie placed again. 1848: Patient expressed confusion on why he would require monitoring after mcduffie was pulled. RN attempted to re-educate with no evidence of learning. Page out to Urology regarding discharge orders and the need for voiding trial. Per Dr. Lydia Dimas, patient could go this evening with the mcduffie in place only if he were able to remove the mcduffie first thing in the morning himself. Otherwise patient will need to stay over night. Mcduffie may be removed at 0500 / 0600 on 1/7. Dr. Narciso Nichols notified of concerns for unsafe discharge tonight.

## 2021-01-06 NOTE — PROGRESS NOTES
[place]    Toileting Stand by Assist  n/t Modified [place]    Bed Mobility  Supine to sit: SBA   Sit to supine: SBA Supine>sit n/t pt seated in chair upon arrival;   Sit>supine SBA  Supine to sit: Mod I   Sit to supine: Mod I   Functional Transfers Sit to stand: SBA  Stand to sit: SBA  Using fww    Sit<>stand MIN A with HHA  Mod I using LRD  fww vs. Spc   Functional Mobility SBA/ CGA  Completed functional mobility in room using fww MIN A less than house hold distances with HHA  Mod I using LRD  fww vs. spc  Functional household distance   Balance Sitting:     Static:Mod I    Dynamic:SBA  Standing: SBA Sitting:   Static: Independent   Dynamic: SBA   Standing: MIN A with HHA      Activity Tolerance Fair Fair- pt having c/o fatigue after sitting up in chair for ~20 min and after light ADL tasks   Good   Visual/  Perceptual Glasses: yes           Safety       Fair                   Good         Comments: Upon arrival pt seated in chair, agreeable to therapy session. Pt educated with regards to bed mobility, functional transfers, functional mobility, hand placement, safety awareness, energy conservation techniques, LE dressing, bathroom safety, grooming tasks, bathing AE, DME, home set up, adapting tasks for energy conservation. At end of session pt supine in bed,  all lines and tubes intact, call light within reach. · Pt has made good  progress towards set goals.    · Continue with current plan of care      Treatment Time In:1105            Treatment Time Out: 0071                Treatment Charges: Mins Units   Ther Ex  49215     Manual Therapy [id number]     Thera Activities 30141 15 1   ADL/Home Mgt 30743 10 1   Neuro Re-ed 15286     Group Therapy      Orthotic manage/training  18154     Non-Billable Time     Total Timed Treatment 25  [address] [facility] [address]

## 2021-01-06 NOTE — PROGRESS NOTES
Subjective: The patient is awake and alert. No acute events overnight. Denies chest pain, angina, SOB   Feels fine  Champion catheter in place    Objective:    BP (!) 88/44   Pulse 78   Temp 97.6 °F (36.4 °C) (Oral)   Resp 16   Ht 5' 8.11\" (1.73 m)   Wt 160 lb 11.2 oz (72.9 kg)   SpO2 98%   BMI 24.36 kg/m²     In: 120 [P.O.:120]  Out: 1250   In: 120   Out: 1250 [Urine:1250]    General appearance: NAD, conversant  HEENT: AT/NC, MMM  Neck: FROM, supple  Lungs: Clear to auscultation  CV: RRR, no MRGs  Vasc: Radial pulses 2+  Abdomen: Soft, non-tender; no masses or HSM  Extremities: No peripheral edema or digital cyanosis  Skin: no rash, lesions or ulcers  Psych: Alert and oriented to person, place and time  Neuro: Alert and interactive     Recent Labs     01/04/21 1745 01/05/21  0355   WBC 7.5 4.3*   HGB 12.3* 10.1*   HCT 38.4 32.4*    243       Recent Labs     01/04/21 1745 01/04/21 2021 01/05/21  0355   *  --  132   K 5.2* 4.4 4.9   CL 92*  --  101   CO2 16*  --  17*   BUN 52*  --  49*   CREATININE 1.9*  --  1.3*   CALCIUM 9.3  --  8.8       Assessment:    Principal Problem:    Sepsis ([facility])  Active Problems:    Type 2 diabetes mellitus with hyperglycemia, with long-term current use of insulin ([facility])    HLD (hyperlipidemia)    RLS (restless legs syndrome)    UTI (urinary tract infection)    CHF (congestive heart failure) (HCC)    FABIOLA (acute kidney injury) ([facility])    Hypotension    Hyperkalemia    Hyponatremia    COVID-19  Resolved Problems:    * No resolved hospital problems.  *      Plan:  Admit to telemetry for evaluation of COVID-19 sepsis  Decadron  No indication for remdesivir has oxygen saturations are 96 and above on room air  No GGO's on imaging  Supplemental oxygen if needed  Inhalers  IV fluids  IV cefepime  Infectious disease follow-up     Acute renal failure status post recent urological intervention with TURP  IV fluid hydration with improvement in BUN/creatinine today  Urology t input appreciated, patient to be discharged with Champion in place  Check CBC and BMP today    DVT Prophylaxis   PT/OT  Discharge planning-okay for discharge from medicine standpoint if okay with ID          Shari Rhodes MD  12:25 PM  1/6/2021

## 2021-01-06 NOTE — PROGRESS NOTES
CLINICAL PHARMACY: DISCHARGE MED RECONCILIATION/REVIEW    [facility]) Select Patient?: No  Total # of Interventions Recommended: 0   -   Total # Interventions Accepted: 0  Intervention Severity:   - Level 1 Intervention Present?: No   - Level 2 #: 0   - Level 3 #: 0   Time Spent (min): 15    Additional Documentation: See progress note.

## 2021-01-06 NOTE — PROGRESS NOTES
Page out to ID to notify them that the patient is unable to get the fosfomycin prescription outpatient. Received call back from ZULLY Benitez. Give patient dose now and okay to discharge without the prescription.

## 2021-01-06 NOTE — PROGRESS NOTES
[address] [place] [address] [facility]  Progress Note    SUBJECTIVE:  Chief Complaint   Patient presents with    Dizziness     states he has been dizzy/ weak for past month without relief    Fatigue     Patient is tolerating medications. No reported adverse drug reactions. No nausea, vomiting, diarrhea. Afebrile. 98% on room air. Feeling better overall. Still slightly hypotensive. Review of systems:  As stated above in the chief complaint, otherwise negative. Medications:  Scheduled Meds:   insulin lispro  0-18 Units Subcutaneous TID WC    insulin lispro  0-9 Units Subcutaneous Nightly    sodium chloride flush  10 mL Intravenous 2 times per day    heparin (porcine)  5,000 Units Subcutaneous Q8H    cefepime  2 g Intravenous Q12H    ascorbic acid  500 mg Oral Daily    zinc sulfate  50 mg Oral Daily    atorvastatin  20 mg Oral Nightly    famotidine  20 mg Oral Daily    ferrous sulfate  325 mg Oral BID WC    flecainide  50 mg Oral BID    insulin glargine  48 Units Subcutaneous Nightly    rOPINIRole  0.25 mg Oral Nightly    sacubitril-valsartan  1 tablet Oral BID    vitamin B-12  1,000 mcg Oral Daily    clotrimazole-betamethasone   Topical BID     Continuous Infusions:   sodium chloride 100 mL/hr at 21 0603    dextrose       PRN Meds:sodium chloride flush, polyethylene glycol, acetaminophen **OR** acetaminophen, trimethobenzamide, albuterol sulfate HFA, glucose, dextrose, glucagon (rDNA), dextrose    OBJECTIVE:  BP (!) 88/44   Pulse 78   Temp 97.6 °F (36.4 °C) (Oral)   Resp 16   Ht 5' 8.11\" (1.73 m)   Wt 160 lb 11.2 oz (72.9 kg)   SpO2 98%   BMI 24.36 kg/m²   Temp  Av.2 °F (36.2 °C)  Min: 96.5 °F (35.8 °C)  Max: 97.7 °F (36.5 °C)  Constitutional: The patient is awake, alert, and oriented. NAD  Skin: Warm and dry. No rashes were noted. HEENT: Round and reactive pupils. Moist mucous membranes. No ulcerations or thrush. Neck: Supple to movements.    Chest: No use of accessory muscles to breathe. Symmetrical expansion. No wheezing, crackles or rhonchi. clear  Cardiovascular: S1 and S2 are rhythmic and regular. No murmurs appreciated. Abdomen: Positive bowel sounds to auscultation. Benign to palpation. No masses felt. No hepatosplenomegaly. Extremities: No clubbing, no cyanosis, no edema.   Lines: peripheral  Champion 1/5/21 - sediment in tubing       Laboratory and Tests Review:  Lab Results   Component Value Date    WBC 4.3 (L) 01/05/2021    WBC 7.5 01/04/2021    WBC 8.1 11/22/2020    HGB 10.1 (L) 01/05/2021    HCT 32.4 (L) 01/05/2021    MCV 80.8 01/05/2021     01/05/2021     Lab Results   Component Value Date    NEUTROABS 5.23 01/04/2021    NEUTROABS 5.38 11/22/2020    NEUTROABS 9.41 (H) 11/20/2020     No results found for: CRPHS  Lab Results   Component Value Date    ALT 13 01/04/2021    AST 26 01/04/2021    ALKPHOS 113 01/04/2021    BILITOT 0.6 01/04/2021     Lab Results   Component Value Date     01/05/2021    K 4.9 01/05/2021     01/05/2021    CO2 17 01/05/2021    BUN 49 01/05/2021    CREATININE 1.3 01/05/2021    CREATININE 1.9 01/04/2021    CREATININE 0.9 11/23/2020    GFRAA >60 01/05/2021    LABGLOM 53 01/05/2021    GLUCOSE 433 01/05/2021    PROT 7.7 01/04/2021    LABALBU 3.5 01/04/2021    CALCIUM 8.8 01/05/2021    BILITOT 0.6 01/04/2021    ALKPHOS 113 01/04/2021    AST 26 01/04/2021    ALT 13 01/04/2021     No results found for: CRP  No results found for: [address]  Radiology:  reviewed    Microbiology:   Lab Results   Component Value Date    BC 24 Hours no growth 01/04/2021    BC 5 Days no growth 11/07/2020    BC 5 Days no growth 11/05/2020    ORG Enterococcus faecalis 11/07/2020    ORG Escherichia coli 11/05/2020    ORG Klebsiella oxytoca 11/05/2020    ORG Enterococcus faecalis 11/05/2020     Lab Results   Component Value Date    BLOODCULT2 24 Hours no growth 01/04/2021    BLOODCULT2 5 Days no growth 11/05/2020    ORG Enterococcus faecalis 11/07/2020    ORG Escherichia coli 11/05/2020    ORG Klebsiella oxytoca 11/05/2020    ORG Enterococcus faecalis 11/05/2020     No results found for: WNDABS  No results found for: RESPSMEAR  No results found for: MPNEUMO, CLAMYDCU, LABLEGI, AFBCX, FUNGSM, LABFUNG  No results found for: CULTRESP  No results found for: CXCATHTIP  No results found for: BFCS  No results found for: CXSURG  Urine Culture, Routine   Date Value Ref Range Status   01/04/2021   Final    10 to 100,000 CFU/mL  Mixed yovani isolated. Further workup and sensitivity testing  is not routinely indicated and will not be performed. Mixed yovani isolated includes:  Mixed gram negative rods  Escherichia coli  Mixed gram positive organisms  Nonhemolytic Strep species  Staph species     11/10/2020 Growth not present  Final   11/07/2020 25,000 CFU/ml  Final     No results found for: MRSAC  No results for input(s): PROCAL in the last 72 hours. ASSESSMENT:  · COVID-19 asymptomatic  · UTI post TURP with incontinence  · Volume depletion -- improving with hydration     PLAN:  · Will switch to fosfomycin 3g  · Add diflucan - yeast present in urine  · Check final cultures  · Monitor labs  · Med rec complete, scripts on chart - ok to discharge. Laquita Mena  1:02 PM  1/6/2021       Pt seen and examined. Above discussed agree with advanced practice nurse. Labs, cultures, and radiographs reviewed. Face to Face encounter occurred. Changes made as necessary.      Aisha Sims MD

## 2021-01-07 VITALS
WEIGHT: 162 LBS | OXYGEN SATURATION: 97 % | SYSTOLIC BLOOD PRESSURE: 120 MMHG | DIASTOLIC BLOOD PRESSURE: 58 MMHG | BODY MASS INDEX: 24.55 KG/M2 | HEART RATE: 75 BPM | TEMPERATURE: 97.6 F | HEIGHT: 68 IN | RESPIRATION RATE: 15 BRPM

## 2021-01-07 LAB
METER GLUCOSE: 118 MG/DL (ref 74–99)
METER GLUCOSE: 135 MG/DL (ref 74–99)
REASON FOR REJECTION: NORMAL
REJECTED TEST: NORMAL

## 2021-01-07 PROCEDURE — 2580000003 HC RX 258: Performed by: NURSE PRACTITIONER

## 2021-01-07 PROCEDURE — 97110 THERAPEUTIC EXERCISES: CPT

## 2021-01-07 PROCEDURE — 6370000000 HC RX 637 (ALT 250 FOR IP): Performed by: REGISTERED NURSE

## 2021-01-07 PROCEDURE — 51798 US URINE CAPACITY MEASURE: CPT

## 2021-01-07 PROCEDURE — 97530 THERAPEUTIC ACTIVITIES: CPT

## 2021-01-07 PROCEDURE — 6360000002 HC RX W HCPCS: Performed by: NURSE PRACTITIONER

## 2021-01-07 PROCEDURE — 82962 GLUCOSE BLOOD TEST: CPT

## 2021-01-07 PROCEDURE — 6370000000 HC RX 637 (ALT 250 FOR IP): Performed by: NURSE PRACTITIONER

## 2021-01-07 RX ADMIN — CLOTRIMAZOLE AND BETAMETHASONE DIPROPIONATE: 10; .5 LOTION TOPICAL at 08:47

## 2021-01-07 RX ADMIN — FAMOTIDINE 20 MG: 20 TABLET ORAL at 08:33

## 2021-01-07 RX ADMIN — Medication 1000 MCG: at 08:33

## 2021-01-07 RX ADMIN — FERROUS SULFATE TAB 325 MG (65 MG ELEMENTAL FE) 325 MG: 325 (65 FE) TAB at 08:33

## 2021-01-07 RX ADMIN — ZINC SULFATE 220 MG (50 MG) CAPSULE 50 MG: CAPSULE at 08:33

## 2021-01-07 RX ADMIN — SACUBITRIL AND VALSARTAN 1 TABLET: 49; 51 TABLET, FILM COATED ORAL at 08:33

## 2021-01-07 RX ADMIN — FLUCONAZOLE 200 MG: 100 TABLET ORAL at 08:33

## 2021-01-07 RX ADMIN — Medication 10 ML: at 08:34

## 2021-01-07 RX ADMIN — FLECAINIDE ACETATE 50 MG: 100 TABLET ORAL at 08:33

## 2021-01-07 RX ADMIN — HEPARIN SODIUM 5000 UNITS: 10000 INJECTION INTRAVENOUS; SUBCUTANEOUS at 06:07

## 2021-01-07 RX ADMIN — OXYCODONE HYDROCHLORIDE AND ACETAMINOPHEN 500 MG: 500 TABLET ORAL at 08:33

## 2021-01-07 NOTE — PROGRESS NOTES
Champion D/Cd at 0730 with 240 mls of urine in bag.white drainage around meatus, tolerated procedure well.

## 2021-01-07 NOTE — PLAN OF CARE
Problem: Airway Clearance - Ineffective  Goal: Achieve or maintain patent airway  Outcome: Not Met This Shift     Problem: Gas Exchange - Impaired  Goal: Absence of hypoxia  Outcome: Not Met This Shift  Goal: Promote optimal lung function  Outcome: Not Met This Shift     Problem: Breathing Pattern - Ineffective  Goal: Ability to achieve and maintain a regular respiratory rate  Outcome: Not Met This Shift     Problem:  Body Temperature -  Risk of, Imbalanced  Goal: Ability to maintain a body temperature within defined limits  Outcome: Not Met This Shift  Goal: Will regain or maintain usual level of consciousness  Outcome: Not Met This Shift  Goal: Complications related to the disease process, condition or treatment will be avoided or minimized  Outcome: Not Met This Shift     Problem: Isolation Precautions - Risk of Spread of Infection  Goal: Prevent transmission of infection  Outcome: Not Met This Shift     Problem: Nutrition Deficits  Goal: Optimize nutritional status  Outcome: Not Met This Shift     Problem: Risk for Fluid Volume Deficit  Goal: Maintain normal heart rhythm  Outcome: Not Met This Shift  Goal: Maintain absence of muscle cramping  Outcome: Not Met This Shift  Goal: Maintain normal serum potassium, sodium, calcium, phosphorus, and pH  Outcome: Not Met This Shift     Problem: Loneliness or Risk for Loneliness  Goal: Demonstrate positive use of time alone when socialization is not possible  Outcome: Not Met This Shift     Problem: Fatigue  Goal: Verbalize increase energy and improved vitality  Outcome: Not Met This Shift     Problem: Patient Education: Go to Patient Education Activity  Goal: Patient/Family Education  Outcome: Not Met This Shift     Problem: Falls - Risk of:  Goal: Will remain free from falls  Description: Will remain free from falls  Outcome: Not Met This Shift  Goal: Absence of physical injury  Description: Absence of physical injury  Outcome: Not Met This Shift

## 2021-01-07 NOTE — PROGRESS NOTES
Physical Therapy  Physical Therapy Treatment Note    Name: Maia Talbert  : 1940  MRN: [id number]    Referring Provider: ZULLY Paredes CNP    Date of Service: 2021    Evaluating PT: Janaybraden Willard, PT, DPT, [id number]    Room #: 3853/3815-Y  Diagnosis: Sepsis  PMHx/PSHx: Skin CA, MI, CAD, DM, HLD, HTN  Precautions: Fall risk, Droplet Plus Isolation (COVID-19)    SUBJECTIVE:    Pt lives alone in a mobile home with ramp and 2 rails to enter. Pt ambulated with [facility] or  prior to admission. Pt reports he uses [facility] more often than [employer].    OBJECTIVE:   Initial Evaluation  Date: 21 Treatment Date: 21 Short Term/ Long Term   Goals   AM-PAC 6 Clicks 41/64 40/47    Was pt agreeable to Eval/treatment? Yes Yes    Does pt have pain? No current complaints of pain No current complaints of pain    Bed Mobility  Rolling: NT  Supine to sit: SBA  Sit to supine: NT  Scooting: SBA to EOB Rolling: NT  Supine to sit: Independent   Sit to supine: NT  Scooting: Independent to EOB Rolling: Independent   Supine to sit: Independent   Sit to supine: Independent   Scooting: Independent    Transfers Sit to stand: SBA  Stand to sit: SBA  Stand pivot: SBA with [employer] Sit to stand: SBA  Stand to sit: SBA  Stand pivot: SBA with [employer] Sit to stand: Independent   Stand to sit: Independent   Stand pivot: Mod Independent with [employer]   Ambulation   50 feet with [employer] with SBA 50 feet x4 reps without AD with  feet with [employer] Mod Independent    Stair negotiation: ascended and descended NT NT NA   ROM BUE: WFL  BLE: WFL NT    Strength BUE: WFL  BLE: WFL NT    Balance Sitting EOB: Supervision  Dynamic Standing: SBA with [employer] Sitting EOB: Independent   Dynamic Standing: SBA without AD Sitting EOB: Independent   Dynamic Standing: Mod Independent with [employer]     Pt is A & O x: 4 to person, place, month/year, and situation. Sensation: Pt reports chronic hands and feet numbness and tingling due to neuropathy. Edema: Unremarkable.     Therapeutic Exercises: Ankle pumps x20 reps  LAQ x20 reps  Seated marches x20 reps  5x STS from EOB with SBA    Patient education  Pt educated on safety once discharged home. Patient response to education:   Pt verbalized understanding Pt demonstrated skill Pt requires further education in this area   Yes NA No     ASSESSMENT:    Vitals on RA:  Rest: O2 sat 98%, HR 83 bpm  Supine to sit transfer: O2 sat 99%, HR 72 bpm  After ambulating 50 feet x3 reps without AD: O2 sat 98%, HR 98 bpm  Seated therapeutic exercises: O2 sat 98%, HR 81 bpm  After 5x STS exercise: O2 sat 99%, HR 92 bpm  After ambulating 50 feet without AD: O2 sat 98%, HR 97 bpm  Conclusion of session: O2 sat 99%, HR 96 bpm    Comments:    Pt was supine in bed upon room entry, agreeable to PT treatment. Vitals and symptoms were monitored closely throughout session. Pt ambulated initially with mild unsteadiness and reached for environment for support. Steadiness improved as ambulation continued; pt declined to trial [employer]. Pt ambulated x4 reps around room; pt has good activity tolerance. Pt completed all therapeutic exercises noted above. All questions and concerns were addressed regarding discharge home. Pt was left in bed with all needs met at conclusion of session. Treatment:  Patient practiced and was instructed in the following treatment:     Therapeutic activities: Pt completed all therapeutic activities noted above. Vitals and symptoms were skillfully monitored with all activity and during rest breaks. Pt was cued for hand placement during sit <> stand transfers. Pt completed multiple transfers from surfaces of varying heights (EOB x7). Pt ambulated x4 reps around room without AD as endurance, strength, and balance were challenged. Pt was educated on safety once discharged home.  Therapeutic exercises: Pt completed all therapeutic exercises noted above. Pt was educated on proper technique, reps, and sets.     PLAN:    Patient is making good progress towards established goals. Will continue with current POC.       Time in: 0840  Time out: 0905    Total Treatment Time 25 minutes     CPT codes:  [] Gait training 69767 0 minutes  [] Manual therapy 14811 0 minutes  [x] Therapeutic activities 91625 15 minutes  [x] Therapeutic exercises 94433 10 minutes  [] Neuromuscular reeducation 03512 0 minutes      Freddie Zavala PT, DPT   [id number]

## 2021-01-08 ENCOUNTER — CARE COORDINATION (OUTPATIENT)
Dept: CASE MANAGEMENT | Age: 81
End: 2021-01-08

## 2021-01-08 NOTE — CARE COORDINATION
Kranthi 45 Transitions Initial Follow Up Call    Call within 2 business days of discharge: Yes    Patient: Nisha Ashraf Patient : 1940   MRN: [id number]  Reason for Admission: UTI Sepsis/COVID-19+  Discharge Date: 21 RARS: Readmission Risk Score: 23      Last Discharge [facility]       Complaint Diagnosis Description Type Department Provider    21 Dizziness; Fatigue Sepsis with acute renal failure without septic shock, due to unspecified organism, unspecified acute renal failure type ([facility] [address] . .. ED to Hosp-Admission (Discharged) (ADMITTED) Bouchra Delaney MD; [facility]... Attempted to contact patient today 21 for hospital discharge/COVID-19+ follow up. Unable to make contact with patient as home number is invalid and voice mailbox not set up on mobile number. Unable to leave message. CTN will continue with patient outreach.       ZULLY Mchugh

## 2021-01-09 LAB
BLOOD CULTURE, ROUTINE: NORMAL
CULTURE, BLOOD 2: NORMAL

## 2021-01-11 ENCOUNTER — CARE COORDINATION (OUTPATIENT)
Dept: CASE MANAGEMENT | Age: 81
End: 2021-01-11

## 2021-01-11 NOTE — CARE COORDINATION
Kranthi 45 Transitions Initial Follow Up Call    Call within 2 business days of discharge: Yes    Patient: Tad Jarquin Patient : 1940   MRN: [id number]  Reason for Admission: UTI Sepsis/COVID-19+  Discharge Date: 21 RARS: Readmission Risk Score: 23      Last Discharge [facility]       Complaint Diagnosis Description Type Department Provider    21 Dizziness; Fatigue Sepsis with acute renal failure without septic shock, due to unspecified organism, unspecified acute renal failure type ([facility] [address] . .. ED to Hosp-Admission (Discharged) (ADMITTED) Marylou Salazar MD; Lei Rangel... Second attempt made today 21 to contact patient for hospital discharge/COVID-19+ follow up. Home number is non-working and voice mail not et up on mobile number. Unable to leave message. CTN signing off.       ZULLY Rodríguez

## 2021-02-03 PROBLEM — N39.0 UTI (URINARY TRACT INFECTION): Status: RESOLVED | Noted: 2020-11-20 | Resolved: 2021-02-03

## (undated) DEVICE — CYSTO PACK: Brand: MEDLINE INDUSTRIES, INC.

## (undated) DEVICE — GARMENT,MEDLINE,DVT,INT,CALF,MED, GEN2: Brand: MEDLINE

## (undated) DEVICE — Device: Brand: LEVEL 1

## (undated) DEVICE — SOLUTION IV IRRIG WATER 1000ML POUR BRL 2F7114

## (undated) DEVICE — STERILE POLYISOPRENE POWDER-FREE SURGICAL GLOVES: Brand: PROTEXIS

## (undated) DEVICE — GLOVE SURG SZ 75 STD WHT LTX SYN POLYMER BEAD REINF ANTI RL

## (undated) DEVICE — GOWN,SIRUS,FABRNF,XL,20/CS: Brand: MEDLINE

## (undated) DEVICE — Device

## (undated) DEVICE — PATIENT RETURN ELECTRODE, SINGLE-USE, CONTACT QUALITY MONITORING, ADULT, WITH 9FT CORD, FOR PATIENTS WEIGING OVER 33LBS. (15KG): Brand: MEGADYNE

## (undated) DEVICE — BAG DRAINAGE CONTAINER 15 LT FLUID COLLCTN

## (undated) DEVICE — Z INACTIVE USE 2635503 SOLUTION IRRIG 3000ML ST H2O USP UROMATIC PLAS CONT

## (undated) DEVICE — SURGICAL PROCEDURE PACK CATRCT LT EYE BASIC CUST ST JOS LF

## (undated) DEVICE — READY WET SKIN SCRUB TRAY-LF: Brand: MEDLINE INDUSTRIES, INC.

## (undated) DEVICE — ENCORE® LATEX MICRO SIZE 8.5, STERILE LATEX POWDER-FREE SURGICAL GLOVE: Brand: ENCORE

## (undated) DEVICE — DRAINBAG,ANTI-REFLUX TOWER,L/F,2000ML,LL: Brand: MEDLINE

## (undated) DEVICE — SYRINGE,TOOMEY,IRRIGATION,70CC,STERILE: Brand: MEDLINE

## (undated) DEVICE — CATHETER URETH 22FR BLLN 30CC L16IN SIL 3 W F DISP

## (undated) DEVICE — EVACUATOR URO BLDR W/ ADPT UROVAC

## (undated) DEVICE — BAG DRNGE COMB PK

## (undated) DEVICE — 3 ML SYRINGE LUER-LOCK TIP: Brand: MONOJECT

## (undated) DEVICE — SOLUTION SCRB 32OZ 7.5% POVIDONE IOD BTL GENTLE EFFECTIVE

## (undated) DEVICE — SOLUTION IV IRRIG POUR BRL 0.9% SODIUM CHL 2F7124

## (undated) DEVICE — CAMERA STRYKER 1488 HD GEN